# Patient Record
Sex: FEMALE | Race: WHITE | NOT HISPANIC OR LATINO | Employment: OTHER
[De-identification: names, ages, dates, MRNs, and addresses within clinical notes are randomized per-mention and may not be internally consistent; named-entity substitution may affect disease eponyms.]

---

## 2017-12-24 ENCOUNTER — HEALTH MAINTENANCE LETTER (OUTPATIENT)
Age: 57
End: 2017-12-24

## 2020-03-10 ENCOUNTER — HEALTH MAINTENANCE LETTER (OUTPATIENT)
Age: 60
End: 2020-03-10

## 2020-08-24 ENCOUNTER — VIRTUAL VISIT (OUTPATIENT)
Dept: PHARMACY | Facility: CLINIC | Age: 60
End: 2020-08-24
Payer: COMMERCIAL

## 2020-08-24 DIAGNOSIS — J82.83 EOSINOPHILIC ASTHMA: Primary | ICD-10-CM

## 2020-08-24 DIAGNOSIS — J30.2 SEASONAL ALLERGIC RHINITIS, UNSPECIFIED TRIGGER: ICD-10-CM

## 2020-08-24 DIAGNOSIS — G43.909 MIGRAINE WITHOUT STATUS MIGRAINOSUS, NOT INTRACTABLE, UNSPECIFIED MIGRAINE TYPE: ICD-10-CM

## 2020-08-24 DIAGNOSIS — I10 ESSENTIAL HYPERTENSION, BENIGN: ICD-10-CM

## 2020-08-24 DIAGNOSIS — R61 GENERALIZED HYPERHIDROSIS: ICD-10-CM

## 2020-08-24 DIAGNOSIS — K21.00 GASTROESOPHAGEAL REFLUX DISEASE WITH ESOPHAGITIS: ICD-10-CM

## 2020-08-24 DIAGNOSIS — E78.5 HYPERLIPIDEMIA LDL GOAL <100: ICD-10-CM

## 2020-08-24 DIAGNOSIS — M79.7 FIBROMYALGIA: ICD-10-CM

## 2020-08-24 DIAGNOSIS — E03.9 HYPOTHYROIDISM, UNSPECIFIED TYPE: ICD-10-CM

## 2020-08-24 DIAGNOSIS — Z79.82 LONG TERM (CURRENT) USE OF ASPIRIN: ICD-10-CM

## 2020-08-24 PROCEDURE — 99607 MTMS BY PHARM ADDL 15 MIN: CPT | Mod: TEL | Performed by: PHARMACIST

## 2020-08-24 PROCEDURE — 99605 MTMS BY PHARM NP 15 MIN: CPT | Mod: TEL | Performed by: PHARMACIST

## 2020-08-24 RX ORDER — CETIRIZINE HYDROCHLORIDE 10 MG/1
10 TABLET ORAL DAILY
COMMUNITY

## 2020-08-24 RX ORDER — GUAIFENESIN 600 MG/1
600 TABLET, EXTENDED RELEASE ORAL 2 TIMES DAILY
COMMUNITY

## 2020-08-24 RX ORDER — DULOXETIN HYDROCHLORIDE 60 MG/1
120 CAPSULE, DELAYED RELEASE ORAL DAILY
COMMUNITY

## 2020-08-24 RX ORDER — ASPIRIN 81 MG/1
81 TABLET ORAL DAILY
COMMUNITY

## 2020-08-24 RX ORDER — CYCLOBENZAPRINE HCL 10 MG
10 TABLET ORAL 3 TIMES DAILY PRN
COMMUNITY
End: 2024-02-19

## 2020-08-24 RX ORDER — SUCRALFATE 1 G/1
1 TABLET ORAL 4 TIMES DAILY PRN
COMMUNITY

## 2020-08-24 RX ORDER — POTASSIUM CHLORIDE 750 MG/1
1 CAPSULE, EXTENDED RELEASE ORAL DAILY
COMMUNITY
Start: 2020-06-20

## 2020-08-24 NOTE — PROGRESS NOTES
MTM ENCOUNTER  SUBJECTIVE/OBJECTIVE:                           Janelle Allison is a 60 year old female called for an initial visit. She was referred to me from the [facility] RN Case Manager.      Patient consented to a telehealth visit: yes  Telemedicine Visit Details  Type of service:  Telephone visit  Start Time: 11:00 AM  End Time: 11:50 AM  Originating Location (pt. Location): Home  Distant Location (provider location):  [facility]  Mode of Communication:  Telephone    Chief Complaint: Concern for polypharmacy - feels hot all of the time/sweats very bad.    Allergies/ADRs: Reviewed in EHR  Tobacco:  reports that she has never smoked. She has never used smokeless tobacco.  Alcohol: 1-3 beverages / week  Caffeine: 1 diet sodas/day  Activity: moderate at best - back and feet issues  PMH: Reviewed in EHR    Medication Adherence/Access: Patient uses pill box(es).  Patient takes medications 4 time(s) per day.   Per patient, misses medication 0 times per week.   Medication barriers: none.   The patient fills medications at [facility]: NO, fills medications at [facility] in [place], [place].    Asthma/Allergies/GERD: Current asthma medications: Nucala 100 mg every 30 days, prednisone 5 mg daily, montelukast 10 mg daily, fluticasone nasal spray - 2 puffs twice daily, cetirizine 10 mg daily, azithromycin 250 mg three times weekly (MWF), pantoprazole 40 mg twice daily, sucralfate 1 gram four times daily PRN, Advair 230-21 mcg - two puffs twice daily, Flovent  mcg - two puffs twice daily, and albuterol HFA PRN.  Pt rinses their mouth after using steroid inhaler.   Pt reports the following symptoms: none. 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 Follows with Dr. Kevin Chino in pulmonology. Wants to work down on prednisone dose.   AAP on file: NO    Fibromyalgia: Currently taking duloxetine 120 mg daily and pramipexole 0.5 mg in the morning/1 mg at supper/0.5  mg at bedtime. Pt finds this to be effective for fibromyalgia (had pain on left side only back in March 2020 - went on prednisone for pneumonia as well and it's been better since). Pt reports no current side effects.     Hyperlipidemia: Current therapy includes pravastatin 5 mg once daily.  Pt reports no significant myalgias or other side effects. Went to this dose after pain issues earlier this year.     Migraine: Currently taking eletriptan 40 mg daily PRN, cyclobenzaprine 10 mg three times daily PRN (rare), and Excedrin Migraine PRN (rare). Headaches have gotten much better since retiring. Usually 2-3 days in a row when she does get them. Pt finds this to be effective when needed. Pt reports no current issues.     Primary Prevention/Hypertension: Currently taking aspirin 81 mg daily, valsartan-hydrochlorothiazide 320-25 mg daily, potassium 10 mEq daily, and diltiazem 360 mg daily. Patient does not self-monitor BP.  Patient reports no current medication side effects.  BP Readings from Last 3 Encounters:   03/30/20 114/74   06/06/16 (!) 139/96   03/12/15 127/74     Hypothyroidism: Patient is taking levothyroxine 88 mcg daily. Patient is having the following symptoms: none. Last TSH was 0.80 MIU/L (3/26/20).     Hyperhidrosis: Feels very hot/sweating for years - if it's warm just starts to drip sweat - just depends on what she is doing (especially if active).  Nothing makes it better besides fans/A/C.  Does have a wearable fan that has helped.  Not like a hot flash.     Today's Vitals: There were no vitals taken for this visit.     Wt Readings from Last 5 Encounters:   06/06/16 242 lb 1 oz (109.8 kg)   03/12/15 197 lb (89.4 kg)     ASSESSMENT:                              Medication Adherence: good, no issues identified    Asthma/Allergies/GERD: Stable per patient. Now that she is back on Nucala may benefit from working on further taper off prednisone.     Fibromyalgia: Stable per patient.    Hyperlipidemia: Stable.  .    Migraine: Stable.      Primary Prevention/Hypertension: Stable. Last BP at goal of <140/90 mmHg    Hypothyroidism: Stable. Last TSH is WNL.     Hyperhidrosis: Needs Improvement. Medication incidences (cetirizine <1%, levothyroxine, prednisone).        PLAN:                            1. Pt to work with pulmonologist to taper down on dose of prednisone    I spent 50 minutes with this patient today. A copy of the visit note was provided to the patient's primary care provider.    Will follow up in 6 months or sooner if needed.    The patient was sent via [employer] a summary of these recommendations.     Msiael Dorado, AlexD, BCACP  Medication Therapy Management Pharmacist  Pager: [phone]

## 2020-08-26 VITALS — SYSTOLIC BLOOD PRESSURE: 114 MMHG | DIASTOLIC BLOOD PRESSURE: 74 MMHG

## 2020-08-26 RX ORDER — AZITHROMYCIN 250 MG/1
250 TABLET, FILM COATED ORAL
COMMUNITY

## 2020-08-26 RX ORDER — MONTELUKAST SODIUM 10 MG/1
10 TABLET ORAL AT BEDTIME
COMMUNITY

## 2020-12-20 ENCOUNTER — HEALTH MAINTENANCE LETTER (OUTPATIENT)
Age: 60
End: 2020-12-20

## 2021-04-24 ENCOUNTER — HEALTH MAINTENANCE LETTER (OUTPATIENT)
Age: 61
End: 2021-04-24

## 2021-10-03 ENCOUNTER — HEALTH MAINTENANCE LETTER (OUTPATIENT)
Age: 61
End: 2021-10-03

## 2022-03-20 ENCOUNTER — HEALTH MAINTENANCE LETTER (OUTPATIENT)
Age: 62
End: 2022-03-20

## 2022-05-15 ENCOUNTER — HEALTH MAINTENANCE LETTER (OUTPATIENT)
Age: 62
End: 2022-05-15

## 2022-09-11 ENCOUNTER — HEALTH MAINTENANCE LETTER (OUTPATIENT)
Age: 62
End: 2022-09-11

## 2023-02-07 ENCOUNTER — LAB REQUISITION (OUTPATIENT)
Dept: LAB | Facility: CLINIC | Age: 63
End: 2023-02-07

## 2023-02-07 PROCEDURE — G0145 SCR C/V CYTO,THINLAYER,RESCR: HCPCS | Performed by: STUDENT IN AN ORGANIZED HEALTH CARE EDUCATION/TRAINING PROGRAM

## 2023-02-07 PROCEDURE — 87624 HPV HI-RISK TYP POOLED RSLT: CPT | Performed by: STUDENT IN AN ORGANIZED HEALTH CARE EDUCATION/TRAINING PROGRAM

## 2023-02-12 LAB
BKR LAB AP GYN ADEQUACY: NORMAL
BKR LAB AP GYN INTERPRETATION: NORMAL
BKR LAB AP HPV REFLEX: NORMAL
BKR LAB AP PREVIOUS ABNORMAL: NORMAL
PATH REPORT.COMMENTS IMP SPEC: NORMAL
PATH REPORT.COMMENTS IMP SPEC: NORMAL
PATH REPORT.RELEVANT HX SPEC: NORMAL

## 2023-02-14 ENCOUNTER — HOSPITAL ENCOUNTER (OUTPATIENT)
Dept: GENERAL RADIOLOGY | Facility: CLINIC | Age: 63
Discharge: HOME | End: 2023-02-14
Attending: STUDENT IN AN ORGANIZED HEALTH CARE EDUCATION/TRAINING PROGRAM | Admitting: STUDENT IN AN ORGANIZED HEALTH CARE EDUCATION/TRAINING PROGRAM
Payer: COMMERCIAL

## 2023-02-14 DIAGNOSIS — G89.29 CHRONIC LEFT HIP PAIN: ICD-10-CM

## 2023-02-14 DIAGNOSIS — M25.552 CHRONIC LEFT HIP PAIN: ICD-10-CM

## 2023-02-14 PROCEDURE — 73502 X-RAY EXAM HIP UNI 2-3 VIEWS: CPT

## 2023-02-15 LAB
HUMAN PAPILLOMA VIRUS 16 DNA: NEGATIVE
HUMAN PAPILLOMA VIRUS 18 DNA: NEGATIVE
HUMAN PAPILLOMA VIRUS FINAL DIAGNOSIS: NORMAL
HUMAN PAPILLOMA VIRUS OTHER HR: NEGATIVE

## 2023-05-03 ENCOUNTER — TRANSFERRED RECORDS (OUTPATIENT)
Dept: MULTI SPECIALTY CLINIC | Facility: CLINIC | Age: 63
End: 2023-05-03

## 2023-05-03 LAB — RETINOPATHY: NORMAL

## 2023-09-27 ENCOUNTER — TRANSFERRED RECORDS (OUTPATIENT)
Dept: HEALTH INFORMATION MANAGEMENT | Facility: CLINIC | Age: 63
End: 2023-09-27

## 2023-09-27 ENCOUNTER — LAB REQUISITION (OUTPATIENT)
Dept: LAB | Facility: CLINIC | Age: 63
End: 2023-09-27
Payer: COMMERCIAL

## 2023-09-27 DIAGNOSIS — R53.83 OTHER FATIGUE: ICD-10-CM

## 2023-09-27 DIAGNOSIS — D64.9 ANEMIA, UNSPECIFIED: ICD-10-CM

## 2023-09-27 DIAGNOSIS — R61 GENERALIZED HYPERHIDROSIS: ICD-10-CM

## 2023-09-27 LAB
ALT SERPL-CCNC: 9 U/L (ref 0–50)
AST SERPL-CCNC: 15 U/L (ref 0–45)
CREATININE (EXTERNAL): 0.84 MG/DL (ref 0.52–1.04)
GFR ESTIMATED (EXTERNAL): 78 ML/MIN/1.73M2
GLUCOSE (EXTERNAL): 96 MG/DL (ref 70–99)
HBA1C MFR BLD: 5.9 % (ref 0–5.7)
IRON BINDING CAPACITY (ROCHE): ABNORMAL
IRON SATN MFR SERPL: ABNORMAL %
IRON SERPL-MCNC: <5 UG/DL (ref 37–145)
POTASSIUM (EXTERNAL): 3.57 MMOL/L (ref 3.4–5.3)

## 2023-09-27 PROCEDURE — 84403 ASSAY OF TOTAL TESTOSTERONE: CPT | Mod: ORL | Performed by: STUDENT IN AN ORGANIZED HEALTH CARE EDUCATION/TRAINING PROGRAM

## 2023-09-27 PROCEDURE — 83550 IRON BINDING TEST: CPT | Mod: ORL | Performed by: STUDENT IN AN ORGANIZED HEALTH CARE EDUCATION/TRAINING PROGRAM

## 2023-09-27 PROCEDURE — 82670 ASSAY OF TOTAL ESTRADIOL: CPT | Mod: ORL | Performed by: STUDENT IN AN ORGANIZED HEALTH CARE EDUCATION/TRAINING PROGRAM

## 2023-09-28 LAB — ESTRADIOL SERPL-MCNC: <5 PG/ML

## 2023-09-29 LAB — TESTOST SERPL-MCNC: 9 NG/DL (ref 8–60)

## 2023-10-25 ENCOUNTER — TRANSFERRED RECORDS (OUTPATIENT)
Dept: HEALTH INFORMATION MANAGEMENT | Facility: CLINIC | Age: 63
End: 2023-10-25
Payer: COMMERCIAL

## 2023-10-31 ENCOUNTER — ANCILLARY PROCEDURE (OUTPATIENT)
Dept: BONE DENSITY | Facility: CLINIC | Age: 63
End: 2023-10-31
Attending: STUDENT IN AN ORGANIZED HEALTH CARE EDUCATION/TRAINING PROGRAM
Payer: COMMERCIAL

## 2023-10-31 ENCOUNTER — HOSPITAL ENCOUNTER (OUTPATIENT)
Dept: MAMMOGRAPHY | Facility: CLINIC | Age: 63
Discharge: HOME | End: 2023-10-31
Attending: STUDENT IN AN ORGANIZED HEALTH CARE EDUCATION/TRAINING PROGRAM | Admitting: STUDENT IN AN ORGANIZED HEALTH CARE EDUCATION/TRAINING PROGRAM
Payer: COMMERCIAL

## 2023-10-31 DIAGNOSIS — Z78.0 POSTMENOPAUSAL STATE: ICD-10-CM

## 2023-10-31 DIAGNOSIS — Z12.39 BREAST CANCER SCREENING: ICD-10-CM

## 2023-10-31 PROCEDURE — 77080 DXA BONE DENSITY AXIAL: CPT | Performed by: INTERNAL MEDICINE

## 2023-10-31 PROCEDURE — 77067 SCR MAMMO BI INCL CAD: CPT

## 2023-11-09 ENCOUNTER — TRANSCRIBE ORDERS (OUTPATIENT)
Dept: OTHER | Age: 63
End: 2023-11-09

## 2023-11-09 DIAGNOSIS — R61 DIAPHORESIS: ICD-10-CM

## 2023-11-09 DIAGNOSIS — E16.2 LOW BLOOD SUGAR: Primary | ICD-10-CM

## 2023-12-10 ENCOUNTER — HEALTH MAINTENANCE LETTER (OUTPATIENT)
Age: 63
End: 2023-12-10

## 2024-02-08 ENCOUNTER — LAB REQUISITION (OUTPATIENT)
Dept: LAB | Facility: CLINIC | Age: 64
End: 2024-02-08
Payer: COMMERCIAL

## 2024-02-08 DIAGNOSIS — Z12.11 ENCOUNTER FOR SCREENING FOR MALIGNANT NEOPLASM OF COLON: ICD-10-CM

## 2024-02-08 DIAGNOSIS — D64.9 ANEMIA, UNSPECIFIED: ICD-10-CM

## 2024-02-08 DIAGNOSIS — E83.42 HYPOMAGNESEMIA: ICD-10-CM

## 2024-02-08 LAB
BASOPHILS # BLD AUTO: 0.1 10E3/UL (ref 0–0.2)
BASOPHILS NFR BLD AUTO: 1 %
EOSINOPHIL # BLD AUTO: 0.1 10E3/UL (ref 0–0.7)
EOSINOPHIL NFR BLD AUTO: 2 %
ERYTHROCYTE [DISTWIDTH] IN BLOOD BY AUTOMATED COUNT: 17 % (ref 10–15)
HCT VFR BLD AUTO: 30.1 % (ref 35–47)
HGB BLD-MCNC: 8.8 G/DL (ref 11.7–15.7)
IMM GRANULOCYTES # BLD: 0 10E3/UL
IMM GRANULOCYTES NFR BLD: 0 %
IRON BINDING CAPACITY (ROCHE): 386 UG/DL (ref 240–430)
IRON SATN MFR SERPL: 5 % (ref 15–46)
IRON SERPL-MCNC: 18 UG/DL (ref 37–145)
LYMPHOCYTES # BLD AUTO: 1.4 10E3/UL (ref 0.8–5.3)
LYMPHOCYTES NFR BLD AUTO: 19 %
MAGNESIUM SERPL-MCNC: 2.1 MG/DL (ref 1.7–2.3)
MCH RBC QN AUTO: 21.6 PG (ref 26.5–33)
MCHC RBC AUTO-ENTMCNC: 29.2 G/DL (ref 31.5–36.5)
MCV RBC AUTO: 74 FL (ref 78–100)
MONOCYTES # BLD AUTO: 0.7 10E3/UL (ref 0–1.3)
MONOCYTES NFR BLD AUTO: 9 %
NEUTROPHILS # BLD AUTO: 5.1 10E3/UL (ref 1.6–8.3)
NEUTROPHILS NFR BLD AUTO: 69 %
NRBC # BLD AUTO: 0 10E3/UL
NRBC BLD AUTO-RTO: 0 /100
PLATELET # BLD AUTO: 371 10E3/UL (ref 150–450)
RBC # BLD AUTO: 4.07 10E6/UL (ref 3.8–5.2)
RETICS # AUTO: 0.07 10E6/UL (ref 0.03–0.1)
RETICS/RBC NFR AUTO: 1.8 % (ref 0.5–2)
WBC # BLD AUTO: 7.5 10E3/UL (ref 4–11)

## 2024-02-08 PROCEDURE — 85025 COMPLETE CBC W/AUTO DIFF WBC: CPT | Mod: ORL | Performed by: STUDENT IN AN ORGANIZED HEALTH CARE EDUCATION/TRAINING PROGRAM

## 2024-02-08 PROCEDURE — 85045 AUTOMATED RETICULOCYTE COUNT: CPT | Mod: ORL | Performed by: STUDENT IN AN ORGANIZED HEALTH CARE EDUCATION/TRAINING PROGRAM

## 2024-02-08 PROCEDURE — 83735 ASSAY OF MAGNESIUM: CPT | Mod: ORL | Performed by: STUDENT IN AN ORGANIZED HEALTH CARE EDUCATION/TRAINING PROGRAM

## 2024-02-08 PROCEDURE — 99207 BLOOD MORPHOLOGY PATHOLOGIST REVIEW: CPT | Performed by: PATHOLOGY

## 2024-02-08 PROCEDURE — 82728 ASSAY OF FERRITIN: CPT | Mod: ORL | Performed by: STUDENT IN AN ORGANIZED HEALTH CARE EDUCATION/TRAINING PROGRAM

## 2024-02-08 PROCEDURE — 83550 IRON BINDING TEST: CPT | Mod: ORL | Performed by: STUDENT IN AN ORGANIZED HEALTH CARE EDUCATION/TRAINING PROGRAM

## 2024-02-08 PROCEDURE — 82274 ASSAY TEST FOR BLOOD FECAL: CPT | Mod: ORL | Performed by: STUDENT IN AN ORGANIZED HEALTH CARE EDUCATION/TRAINING PROGRAM

## 2024-02-08 PROCEDURE — 82607 VITAMIN B-12: CPT | Mod: ORL | Performed by: STUDENT IN AN ORGANIZED HEALTH CARE EDUCATION/TRAINING PROGRAM

## 2024-02-09 LAB
FERRITIN SERPL-MCNC: 8 NG/ML (ref 11–328)
PATH REPORT.COMMENTS IMP SPEC: NORMAL
PATH REPORT.FINAL DX SPEC: NORMAL
PATH REPORT.MICROSCOPIC SPEC OTHER STN: NORMAL
PATH REPORT.MICROSCOPIC SPEC OTHER STN: NORMAL
VIT B12 SERPL-MCNC: 368 PG/ML (ref 232–1245)

## 2024-02-14 LAB — HEMOCCULT STL QL IA: NEGATIVE

## 2024-02-15 ENCOUNTER — LAB REQUISITION (OUTPATIENT)
Dept: LAB | Facility: CLINIC | Age: 64
End: 2024-02-15
Payer: COMMERCIAL

## 2024-02-15 DIAGNOSIS — R61 GENERALIZED HYPERHIDROSIS: ICD-10-CM

## 2024-02-15 DIAGNOSIS — I10 ESSENTIAL (PRIMARY) HYPERTENSION: ICD-10-CM

## 2024-02-15 DIAGNOSIS — G43.011 MIGRAINE WITHOUT AURA, INTRACTABLE, WITH STATUS MIGRAINOSUS: ICD-10-CM

## 2024-02-15 PROCEDURE — 83835 ASSAY OF METANEPHRINES: CPT | Mod: ORL | Performed by: STUDENT IN AN ORGANIZED HEALTH CARE EDUCATION/TRAINING PROGRAM

## 2024-02-19 ENCOUNTER — OFFICE VISIT (OUTPATIENT)
Dept: ENDOCRINOLOGY | Facility: CLINIC | Age: 64
End: 2024-02-19
Payer: COMMERCIAL

## 2024-02-19 VITALS
TEMPERATURE: 97.8 F | BODY MASS INDEX: 34.19 KG/M2 | OXYGEN SATURATION: 95 % | HEIGHT: 68 IN | DIASTOLIC BLOOD PRESSURE: 67 MMHG | RESPIRATION RATE: 18 BRPM | SYSTOLIC BLOOD PRESSURE: 127 MMHG | WEIGHT: 225.6 LBS | HEART RATE: 93 BPM

## 2024-02-19 DIAGNOSIS — E03.9 HYPOTHYROIDISM, UNSPECIFIED TYPE: Primary | ICD-10-CM

## 2024-02-19 DIAGNOSIS — E11.9 TYPE 2 DIABETES MELLITUS WITHOUT COMPLICATION, WITHOUT LONG-TERM CURRENT USE OF INSULIN (H): ICD-10-CM

## 2024-02-19 DIAGNOSIS — D50.8 OTHER IRON DEFICIENCY ANEMIA: ICD-10-CM

## 2024-02-19 DIAGNOSIS — R61 GENERALIZED HYPERHIDROSIS: ICD-10-CM

## 2024-02-19 PROBLEM — I15.2 HYPERTENSION ASSOCIATED WITH DIABETES (H): Status: ACTIVE | Noted: 2024-02-19

## 2024-02-19 PROBLEM — E11.59 HYPERTENSION ASSOCIATED WITH DIABETES (H): Status: ACTIVE | Noted: 2024-02-19

## 2024-02-19 PROBLEM — F32.A DEPRESSION: Status: ACTIVE | Noted: 2024-02-19

## 2024-02-19 PROBLEM — E61.1 IRON DEFICIENCY: Status: ACTIVE | Noted: 2022-05-04

## 2024-02-19 PROBLEM — D64.9 ANEMIA: Status: ACTIVE | Noted: 2024-02-19

## 2024-02-19 PROBLEM — Z79.52 CURRENT CHRONIC USE OF SYSTEMIC STEROIDS: Status: ACTIVE | Noted: 2021-08-04

## 2024-02-19 PROBLEM — M79.7 FIBROMYALGIA: Status: ACTIVE | Noted: 2024-02-19

## 2024-02-19 PROBLEM — E78.5 HYPERLIPIDEMIA: Status: ACTIVE | Noted: 2024-02-19

## 2024-02-19 PROBLEM — G47.33 OSA ON CPAP: Status: ACTIVE | Noted: 2024-02-19

## 2024-02-19 LAB
ANNOTATION COMMENT IMP: NORMAL
CREAT 24H UR-MRATE: 1495 MG/D
CREAT UR-MCNC: 65 MG/DL
METANEPH 24H UR-MCNC: 28 UG/L
METANEPH 24H UR-MRATE: 64 UG/D
METANEPH+NORMETANEPH UR-IMP: ABNORMAL
METANEPH/CREAT 24H UR: 43 UG/G CRT
METANEPHS SERPL-SCNC: <0.1 NMOL/L
NORMETANEPHRINE 24H UR-MCNC: 173 UG/L
NORMETANEPHRINE 24H UR-MRATE: 398 UG/D
NORMETANEPHRINE SERPL-SCNC: 0.51 NMOL/L
NORMETANEPHRINE/CREAT 24H UR: 266 UG/G CRT

## 2024-02-19 PROCEDURE — 99204 OFFICE O/P NEW MOD 45 MIN: CPT | Performed by: PHYSICIAN ASSISTANT

## 2024-02-19 RX ORDER — METFORMIN HCL 500 MG
TABLET, EXTENDED RELEASE 24 HR ORAL
COMMUNITY
Start: 2023-12-16 | End: 2024-02-19

## 2024-02-19 RX ORDER — MELOXICAM 7.5 MG/1
7.5 TABLET ORAL
COMMUNITY
Start: 2021-09-01 | End: 2024-02-19

## 2024-02-19 RX ORDER — ROPINIROLE 1 MG/1
1 TABLET, FILM COATED ORAL 3 TIMES DAILY
COMMUNITY
Start: 2024-02-08 | End: 2024-04-22

## 2024-02-19 RX ORDER — BUPROPION HYDROCHLORIDE 150 MG/1
TABLET ORAL
COMMUNITY
Start: 2023-12-13

## 2024-02-19 RX ORDER — CALCIUM CITRATE/VITAMIN D3 200MG-6.25
TABLET ORAL DAILY
COMMUNITY
Start: 2023-02-08

## 2024-02-19 RX ORDER — NYSTATIN 100000 [USP'U]/G
POWDER TOPICAL
COMMUNITY
Start: 2023-09-28

## 2024-02-19 RX ORDER — LEVOTHYROXINE SODIUM 75 UG/1
75 TABLET ORAL DAILY
Qty: 90 TABLET | Refills: 0 | Status: SHIPPED | OUTPATIENT
Start: 2024-02-19 | End: 2024-04-22

## 2024-02-19 RX ORDER — HYDROCORTISONE 2.5 %
CREAM (GRAM) TOPICAL
COMMUNITY
Start: 2022-10-10 | End: 2024-02-19

## 2024-02-19 RX ORDER — PRAVASTATIN SODIUM 10 MG
1 TABLET ORAL DAILY
COMMUNITY
Start: 2022-09-25

## 2024-02-19 RX ORDER — AMOXICILLIN 250 MG
1 CAPSULE ORAL 2 TIMES DAILY PRN
COMMUNITY
Start: 2022-04-19

## 2024-02-19 RX ORDER — KETOCONAZOLE 20 MG/G
CREAM TOPICAL
COMMUNITY
Start: 2022-10-10 | End: 2024-02-19

## 2024-02-19 RX ORDER — IPRATROPIUM BROMIDE 21 UG/1
2 SPRAY, METERED NASAL EVERY 12 HOURS
COMMUNITY

## 2024-02-19 RX ORDER — BACLOFEN 10 MG/1
TABLET ORAL
COMMUNITY
Start: 2021-06-01

## 2024-02-19 RX ORDER — BECLOMETHASONE DIPROPIONATE HFA 80 UG/1
1 AEROSOL, METERED RESPIRATORY (INHALATION) 2 TIMES DAILY
COMMUNITY

## 2024-02-19 RX ORDER — CELECOXIB 100 MG/1
100 CAPSULE ORAL PRN
COMMUNITY
End: 2024-04-22

## 2024-02-19 NOTE — LETTER
2/19/2024         RE: Janelle Allison  [address] [facility] [address]        Dear Colleague,    Thank you for referring your patient, Janelle Allison, to the [facility]. Please see a copy of my visit note below.    Assessment/Plan :   Hyperhidrosis. We discussed possible causes of excess sweating and feeling lightheaded. We also reviewed her recent laboratory results. I don't think the sweats or lightheadedness is due to her diabetes or hypoglycemia. She appears to be stable on metformin. We also discussed that her recent metanephrine testing was normal. Her TSH is on the low side. This doesn't sound like it is the root cause of the sweating but it may be contributing. I would like to decrease her levothyroxine dose down to 75 mcg daily. A new prescription was sent to her pharmacy today. We will repeat testing in about a month and we will follow-up in a couple months. I would like to see what impact the lower thyroid dose has on her sweating.  Anemia. I am worried about Janelle's anemia. She is no longer menstruating and she has no reason to be iron deficient. I encouraged her to go forward with the infusions and the endoscopy/colonoscopy. We also discussed how anemia can impact energy and restless legs. Again, we will follow-up in a couple months after her iron infusions have been completed and she has had both the endoscopy and colonoscopy completed.       I have independently reviewed and interpreted labs, imaging as indicated.      Chief complaint:  Janelle is a 63 year old female seen in consultation at the request of Dr. Shearer for diaphoresis and lightheadedness.     I have reviewed Care Everywhere including [facility], [facility], [facility], [place], [facility], [facility] , [facility], [facility] lab reports, imaging reports and provider notes as indicated.      HISTORY OF PRESENT ILLNESS  Janelle states that for years she has struggled with sweating. She  always feels hot. It doesn't matter if she is sitting in a room with air conditioning, she will break out in a sweat with even the slightest exertion. These episodes do not seem to be related to anything in particular but they do occur more often in the afternoon or evening. Most recently, she has also felt off. She describes this as being lightheadedness or airheadedness. She just doesn't feel right.    Janelle has a complicated medical history that includes HTN, obesity, Type 2 DM, hyperlipidemia, ABIMAEL, asthma with long term use of prednisone, and hypothyroidism. Her diabetes has been well control on 1000 mg of metformin daily. She monitors her blood sugars as needed and recently wore a 14 day FreeStyle Daryl. She reports that she had one blood sugar reading that was on the low side but, otherwise, everything was within target range. She also reports that she has taken prednisone for years. She has tried to discontinue or decrease the dose but she will immediately experience a flare up of her asthma. Lastly, she has been stable on 88 mcg of levothyroxine for the last few years. Recent testing indicated that her TSH was overly suppressed. She was told that decreasing the dose may lead to worsening fatigue. Lastly, she was also recently diagnosed with iron deficient anemia. She recently underwent her first iron infusion.     Endocrine relevant labs are as follows:   Latest Reference Range & Units 02/08/24 08:36   Hemoglobin 11.7 - 15.7 g/dL 8.8 (L)   (L): Data is abnormally low   Latest Reference Range & Units 02/08/24 08:36   Hematocrit 35.0 - 47.0 % 30.1 (L)   (L): Data is abnormally low   Latest Reference Range & Units 02/08/24 08:35   Ferritin 11 - 328 ng/mL 8 (L)   (L): Data is abnormally low   Latest Reference Range & Units 02/08/24 08:35   Iron 37 - 145 ug/dL 18 (L)   (L): Data is abnormally low   Latest Reference Range & Units 02/08/24 08:35   Iron Sat Index 15 - 46 % 5 (L)   (L): Data is abnormally  low    REVIEW OF SYSTEMS    Endocrine: positive for thyroid disorder, hot flashes, and diabetes  Skin: positive for hyperhidrosis  Eyes: negative for, visual blurring, redness, tearing  Ears/Nose/Throat: negative for, persistent sore throat, hoarseness  Respiratory: No shortness of breath, dyspnea on exertion, cough, or hemoptysis  Cardiovascular: negative for, irregular heart beat, chest pain, dyspnea on exertion, lower extremity edema, and exercise intolerance  Gastrointestinal: negative for, nausea, vomiting, constipation, and diarrhea  Genitourinary: negative for, nocturia, dysuria, frequency, and urgency  Musculoskeletal: negative for, muscular weakness, nocturnal cramping, and foot pain  Neurologic: positive for restless legs, negative for, local weakness, numbness or tingling of hands, and numbness or tingling of feet  Psychiatric: positive for sleep disturbance, anxiety, and depression stable  Hematologic/Lymphatic/Immunologic: positive for anemia and sweats    Past Medical History  Past Medical History:   Diagnosis Date     Arthritis      CPAP (continuous positive airway pressure) dependence      Double vision      Fibromyalgia      Gastro-oesophageal reflux disease      Hypertension      Migraines      RLS (restless legs syndrome)      Sleep apnea     cpap     Thyroid disease      Uncomplicated asthma        Medications  Current Outpatient Medications   Medication Sig Dispense Refill     albuterol (VENTOLIN HFA) 108 (90 BASE) MCG/ACT inhaler Inhale 2 puffs into the lungs every 6 hours as needed        aspirin 81 MG EC tablet Take 81 mg by mouth daily       Aspirin-Acetaminophen-Caffeine (MIGRAINE RELIEF PO) Take 1 tablet by mouth 2 times daily as needed       azithromycin (ZITHROMAX) 250 MG tablet Take 250 mg by mouth Every Mon, Wed, Fri Morning       baclofen (LIORESAL) 10 MG tablet TK 1 T PO TID WC AND WF PRF MUSCLE SPASM       beclomethasone HFA (QVAR REDIHALER) 80 MCG/ACT inhaler Inhale 1 puff into the  lungs 2 times daily       blood glucose (TRUE METRIX BLOOD GLUCOSE TEST) test strip daily       buPROPion (WELLBUTRIN XL) 150 MG 24 hr tablet        Calcium Carbonate-Vitamin D (CALCIUM + D PO) Take 1 tablet by mouth three times a week        celecoxib (CELEBREX) 100 MG capsule Take 100 mg by mouth as needed for moderate pain       cetirizine (ZYRTEC) 10 MG tablet Take 10 mg by mouth daily       Cholecalciferol (VITAMIN D3 PO) Take 25 mcg by mouth once a week        DILTIAZEM HCL PO Take 360 mg by mouth daily       DULoxetine (CYMBALTA) 60 MG capsule Take 120 mg by mouth daily       Eletriptan Hydrobromide (RELPAX PO) Take 40 mg by mouth once as needed        fluticasone (FLONASE) 50 MCG/ACT nasal spray Spray 2 sprays into both nostrils 2 times daily       fluticasone (FLOVENT HFA) 220 MCG/ACT inhaler Inhale 2 puffs into the lungs 2 times daily       fluticasone-salmeterol (ADVAIR-HFA) 230-21 MCG/ACT inhaler Inhale 2 puffs into the lungs 2 times daily       guaiFENesin (MUCINEX) 600 MG 12 hr tablet Take 600 mg by mouth 2 times daily       Hypertonic Nasal Wash (SINUS RINSE KIT) PACK [place] in nostril 2 times daily        ipratropium (ATROVENT) 0.03 % nasal spray Spray 2 sprays into both nostrils every 12 hours       LEVOTHYROXINE SODIUM PO Take 88 mcg by mouth daily        Mepolizumab (NUCALA SC) Inject 100 mg Subcutaneous every 30 days       montelukast (SINGULAIR) 10 MG tablet Take 10 mg by mouth At Bedtime       Multiple Vitamins-Minerals (CENTRUM ULTRA WOMENS PO)        nystatin (MYCOSTATIN) 562268 UNIT/GM external powder        Pantoprazole Sodium (PROTONIX PO) Take 40 mg by mouth 2 times daily (before meals)       potassium chloride ER (MICRO-K) 10 MEQ CR capsule Take 1 capsule by mouth daily       pravastatin (PRAVACHOL) 10 MG tablet Take 1 tablet by mouth daily       PREDNISONE PO Take 5 mg by mouth daily        rOPINIRole (REQUIP) 1 MG tablet        senna-docusate (SENOKOT-S/PERICOLACE) 8.6-50 MG tablet Take  "1 tablet by mouth 2 times daily as needed       sucralfate (CARAFATE) 1 GM tablet Take 1 g by mouth 4 times daily as needed       umeclidinium (INCRUSE ELLIPTA) 62.5 MCG/ACT inhaler Inhale 1 puff into the lungs daily       valsartan-hydrochlorothiazide (DIOVAN-HCT) 320-25 MG per tablet Take 1 tablet by mouth daily         Allergies  Allergies   Allergen Reactions     Hydralazine Headache and Other (See Comments)     [facility] Comment: Plus fever. ; [facility] Reaction: Other    Plus fever.     Plus fever.     Azelastine      Made her feel like she had a cold     Fosamax [Alendronate] Other (See Comments)     Joint pain     Levaquin [Levofloxacin]      Molds & Smuts      Nizatidine      Ranitidine Diarrhea     Other Reaction(s): Not available     Atorvastatin Muscle Pain (Myalgia), Unknown and Other (See Comments)     Aching knees and sore joints.    Achy joints; [facility] Comment: Aching knees and sore joints.; [facility] Reaction: Other; [facility] Reaction Type: Unspecified; [facility] Severity: Low; [facility] Noted: [id number]         Family History  family history is not on file.    Social History  Social History     Tobacco Use     Smoking status: Never     Smokeless tobacco: Never   Substance Use Topics     Alcohol use: Yes     Comment: occasional     Drug use: No       Physical Exam  /67 (BP Location: Left arm, Patient Position: Chair, Cuff Size: Adult Regular)   Pulse 93   Temp 97.8  F (36.6  C) (Oral)   Resp 18   Ht 1.727 m (5' 7.99\")   Wt 102.3 kg (225 lb 9.6 oz)   LMP  (LMP Unknown)   SpO2 95%   Breastfeeding No   BMI 34.31 kg/m    Body mass index is 34.31 kg/m .  GENERAL :  In no apparent distress  SKIN: Normal color, normal temperature, texture.  No hirsutism, alopecia or purple striae.     EYES: PERRL, EOMI, No scleral icterus,  No proptosis, conjunctival redness, stare, retraction  NECK: No visible masses. No palpable adenopathy, or masses. No carotid bruits. THYROID:  Normal, nontender, smooth / firm texture,  no nodules, no Bruit "   RESP: Lungs clear to auscultation bilaterally  CARDIAC: Regular rate and rhythm, normal S1 S2, without murmurs, rubs or gallops  NEURO: awake, alert, responds appropriately to questions.  Cranial nerves intact.   Moves all extremities; Gait normal.  No tremor of the outstretched hand.    EXTREMITIES: No clubbing, cyanosis or edema.    DATA REVIEW  She does not monitor her blood sugars consistently        Again, thank you for allowing me to participate in the care of your patient.        Sincerely,        Neli Blanchard PA-C

## 2024-02-19 NOTE — PROGRESS NOTES
Assessment/Plan :   Hyperhidrosis. We discussed possible causes of excess sweating and feeling lightheaded. We also reviewed her recent laboratory results. I don't think the sweats or lightheadedness is due to her diabetes or hypoglycemia. She appears to be stable on metformin. We also discussed that her recent metanephrine testing was normal. Her TSH is on the low side. This doesn't sound like it is the root cause of the sweating but it may be contributing. I would like to decrease her levothyroxine dose down to 75 mcg daily. A new prescription was sent to her pharmacy today. We will repeat testing in about a month and we will follow-up in a couple months. I would like to see what impact the lower thyroid dose has on her sweating.  Anemia. I am worried about Janelle's anemia. She is no longer menstruating and she has no reason to be iron deficient. I encouraged her to go forward with the infusions and the endoscopy/colonoscopy. We also discussed how anemia can impact energy and restless legs. Again, we will follow-up in a couple months after her iron infusions have been completed and she has had both the endoscopy and colonoscopy completed.       I have independently reviewed and interpreted labs, imaging as indicated.      Chief complaint:  Janelle is a 63 year old female seen in consultation at the request of Dr. Shearer for diaphoresis and lightheadedness.     I have reviewed Care Everywhere including [facility], [facility], [facility], [facility] , [facility], [place] lab reports, imaging reports and provider notes as indicated.      HISTORY OF PRESENT ILLNESS  Janelle states that for years she has struggled with sweating. She always feels hot. It doesn't matter if she is sitting in a room with air conditioning, she will break out in a sweat with even the slightest exertion. These episodes do not seem to be related to anything in particular but they do occur more often in the  afternoon or evening. Most recently, she has also felt off. She describes this as being lightheadedness or airheadedness. She just doesn't feel right.    Janelle has a complicated medical history that includes HTN, obesity, Type 2 DM, hyperlipidemia, ABIMAEL, asthma with long term use of prednisone, and hypothyroidism. Her diabetes has been well control on 1000 mg of metformin daily. She monitors her blood sugars as needed and recently wore a 14 day FreeStyle Daryl. She reports that she had one blood sugar reading that was on the low side but, otherwise, everything was within target range. She also reports that she has taken prednisone for years. She has tried to discontinue or decrease the dose but she will immediately experience a flare up of her asthma. Lastly, she has been stable on 88 mcg of levothyroxine for the last few years. Recent testing indicated that her TSH was overly suppressed. She was told that decreasing the dose may lead to worsening fatigue. Lastly, she was also recently diagnosed with iron deficient anemia. She recently underwent her first iron infusion.     Endocrine relevant labs are as follows:   Latest Reference Range & Units 02/08/24 08:36   Hemoglobin 11.7 - 15.7 g/dL 8.8 (L)   (L): Data is abnormally low   Latest Reference Range & Units 02/08/24 08:36   Hematocrit 35.0 - 47.0 % 30.1 (L)   (L): Data is abnormally low   Latest Reference Range & Units 02/08/24 08:35   Ferritin 11 - 328 ng/mL 8 (L)   (L): Data is abnormally low   Latest Reference Range & Units 02/08/24 08:35   Iron 37 - 145 ug/dL 18 (L)   (L): Data is abnormally low   Latest Reference Range & Units 02/08/24 08:35   Iron Sat Index 15 - 46 % 5 (L)   (L): Data is abnormally low    REVIEW OF SYSTEMS    Endocrine: positive for thyroid disorder, hot flashes, and diabetes  Skin: positive for hyperhidrosis  Eyes: negative for, visual blurring, redness, tearing  Ears/Nose/Throat: negative for, persistent sore throat,  hoarseness  Respiratory: No shortness of breath, dyspnea on exertion, cough, or hemoptysis  Cardiovascular: negative for, irregular heart beat, chest pain, dyspnea on exertion, lower extremity edema, and exercise intolerance  Gastrointestinal: negative for, nausea, vomiting, constipation, and diarrhea  Genitourinary: negative for, nocturia, dysuria, frequency, and urgency  Musculoskeletal: negative for, muscular weakness, nocturnal cramping, and foot pain  Neurologic: positive for restless legs, negative for, local weakness, numbness or tingling of hands, and numbness or tingling of feet  Psychiatric: positive for sleep disturbance, anxiety, and depression stable  Hematologic/Lymphatic/Immunologic: positive for anemia and sweats    Past Medical History  Past Medical History:   Diagnosis Date    Arthritis     CPAP (continuous positive airway pressure) dependence     Double vision     Fibromyalgia     Gastro-oesophageal reflux disease     Hypertension     Migraines     RLS (restless legs syndrome)     Sleep apnea     cpap    Thyroid disease     Uncomplicated asthma        Medications  Current Outpatient Medications   Medication Sig Dispense Refill    albuterol (VENTOLIN HFA) 108 (90 BASE) MCG/ACT inhaler Inhale 2 puffs into the lungs every 6 hours as needed       aspirin 81 MG EC tablet Take 81 mg by mouth daily      Aspirin-Acetaminophen-Caffeine (MIGRAINE RELIEF PO) Take 1 tablet by mouth 2 times daily as needed      azithromycin (ZITHROMAX) 250 MG tablet Take 250 mg by mouth Every Mon, Wed, Fri Morning      baclofen (LIORESAL) 10 MG tablet TK 1 T PO TID WC AND WF PRF MUSCLE SPASM      beclomethasone HFA (QVAR REDIHALER) 80 MCG/ACT inhaler Inhale 1 puff into the lungs 2 times daily      blood glucose (TRUE METRIX BLOOD GLUCOSE TEST) test strip daily      buPROPion (WELLBUTRIN XL) 150 MG 24 hr tablet       Calcium Carbonate-Vitamin D (CALCIUM + D PO) Take 1 tablet by mouth three times a week       celecoxib  (CELEBREX) 100 MG capsule Take 100 mg by mouth as needed for moderate pain      cetirizine (ZYRTEC) 10 MG tablet Take 10 mg by mouth daily      Cholecalciferol (VITAMIN D3 PO) Take 25 mcg by mouth once a week       DILTIAZEM HCL PO Take 360 mg by mouth daily      DULoxetine (CYMBALTA) 60 MG capsule Take 120 mg by mouth daily      Eletriptan Hydrobromide (RELPAX PO) Take 40 mg by mouth once as needed       fluticasone (FLONASE) 50 MCG/ACT nasal spray Spray 2 sprays into both nostrils 2 times daily      fluticasone (FLOVENT HFA) 220 MCG/ACT inhaler Inhale 2 puffs into the lungs 2 times daily      fluticasone-salmeterol (ADVAIR-HFA) 230-21 MCG/ACT inhaler Inhale 2 puffs into the lungs 2 times daily      guaiFENesin (MUCINEX) 600 MG 12 hr tablet Take 600 mg by mouth 2 times daily      Hypertonic Nasal Wash (SINUS RINSE KIT) PACK [place] in nostril 2 times daily       ipratropium (ATROVENT) 0.03 % nasal spray Spray 2 sprays into both nostrils every 12 hours      LEVOTHYROXINE SODIUM PO Take 88 mcg by mouth daily       Mepolizumab (NUCALA SC) Inject 100 mg Subcutaneous every 30 days      montelukast (SINGULAIR) 10 MG tablet Take 10 mg by mouth At Bedtime      Multiple Vitamins-Minerals (CENTRUM ULTRA WOMENS PO)       nystatin (MYCOSTATIN) 073309 UNIT/GM external powder       Pantoprazole Sodium (PROTONIX PO) Take 40 mg by mouth 2 times daily (before meals)      potassium chloride ER (MICRO-K) 10 MEQ CR capsule Take 1 capsule by mouth daily      pravastatin (PRAVACHOL) 10 MG tablet Take 1 tablet by mouth daily      PREDNISONE PO Take 5 mg by mouth daily       rOPINIRole (REQUIP) 1 MG tablet       senna-docusate (SENOKOT-S/PERICOLACE) 8.6-50 MG tablet Take 1 tablet by mouth 2 times daily as needed      sucralfate (CARAFATE) 1 GM tablet Take 1 g by mouth 4 times daily as needed      umeclidinium (INCRUSE ELLIPTA) 62.5 MCG/ACT inhaler Inhale 1 puff into the lungs daily      valsartan-hydrochlorothiazide (DIOVAN-HCT) 320-25 MG  "per tablet Take 1 tablet by mouth daily         Allergies  Allergies   Allergen Reactions    Hydralazine Headache and Other (See Comments)     OHC Comment: Plus fever. ; [facility] Reaction: Other    Plus fever.     Plus fever.    Azelastine      Made her feel like she had a cold    Fosamax [Alendronate] Other (See Comments)     Joint pain    Levaquin [Levofloxacin]     Molds & Smuts     Nizatidine     Ranitidine Diarrhea     Other Reaction(s): Not available    Atorvastatin Muscle Pain (Myalgia), Unknown and Other (See Comments)     Aching knees and sore joints.    Achy joints; OHC Comment: Aching knees and sore joints.; [facility] Reaction: Other; [facility] Reaction Type: Unspecified; [facility] Severity: Low; [facility] Noted: 20161101         Family History  family history is not on file.    Social History  Social History     Tobacco Use    Smoking status: Never    Smokeless tobacco: Never   Substance Use Topics    Alcohol use: Yes     Comment: occasional    Drug use: No       Physical Exam  /67 (BP Location: Left arm, Patient Position: Chair, Cuff Size: Adult Regular)   Pulse 93   Temp 97.8  F (36.6  C) (Oral)   Resp 18   Ht 1.727 m (5' 7.99\")   Wt 102.3 kg (225 lb 9.6 oz)   LMP  (LMP Unknown)   SpO2 95%   Breastfeeding No   BMI 34.31 kg/m    Body mass index is 34.31 kg/m .  GENERAL :  In no apparent distress  SKIN: Normal color, normal temperature, texture.  No hirsutism, alopecia or purple striae.     EYES: PERRL, EOMI, No scleral icterus,  No proptosis, conjunctival redness, stare, retraction  NECK: No visible masses. No palpable adenopathy, or masses. No carotid bruits. THYROID:  Normal, nontender, smooth / firm texture,  no nodules, no Bruit   RESP: Lungs clear to auscultation bilaterally  CARDIAC: Regular rate and rhythm, normal S1 S2, without murmurs, rubs or gallops  NEURO: awake, alert, responds appropriately to questions.  Cranial nerves intact.   Moves all extremities; Gait normal.  No tremor of the outstretched hand.  "   EXTREMITIES: No clubbing, cyanosis or edema.    DATA REVIEW  She does not monitor her blood sugars consistently

## 2024-02-19 NOTE — PATIENT INSTRUCTIONS
[facility]  Dr Diaz, Endocrinology Department    [address] [facility]  [place] [address]  Appointment [phone]  Fax: [phone]  [place]: Monday - Thursday

## 2024-02-19 NOTE — Clinical Note
Please abstract the following data from this visit with this patient into the appropriate field in Epic:  Tests that can be patient reported without a hard copy:  Eye exam with ophthalmology on this date: 05/03/23 Exam Location: [place]  Other Tests found in the patient's chart through Chart Review/Care Everywhere:    Note to [employer]: If this section is blank, no results were found via Chart Review/Care Everywhere.

## 2024-03-06 ENCOUNTER — HOSPITAL ENCOUNTER (OUTPATIENT)
Facility: CLINIC | Age: 64
End: 2024-03-06
Attending: INTERNAL MEDICINE | Admitting: INTERNAL MEDICINE
Payer: COMMERCIAL

## 2024-03-15 ENCOUNTER — TRANSFERRED RECORDS (OUTPATIENT)
Dept: HEALTH INFORMATION MANAGEMENT | Facility: CLINIC | Age: 64
End: 2024-03-15
Payer: COMMERCIAL

## 2024-03-15 ENCOUNTER — LAB REQUISITION (OUTPATIENT)
Dept: LAB | Facility: CLINIC | Age: 64
End: 2024-03-15

## 2024-03-15 DIAGNOSIS — D50.8 OTHER IRON DEFICIENCY ANEMIAS: ICD-10-CM

## 2024-03-15 LAB
IRON BINDING CAPACITY (ROCHE): 331 UG/DL (ref 240–430)
IRON SATN MFR SERPL: 15 % (ref 15–46)
IRON SERPL-MCNC: 51 UG/DL (ref 37–145)
T4 FREE SERPL-MCNC: 1.38 NG/DL (ref 0.9–1.7)
TSH SERPL DL<=0.005 MIU/L-ACNC: 1.61 UIU/ML (ref 0.3–4.2)

## 2024-03-15 PROCEDURE — 84480 ASSAY TRIIODOTHYRONINE (T3): CPT | Performed by: STUDENT IN AN ORGANIZED HEALTH CARE EDUCATION/TRAINING PROGRAM

## 2024-03-15 PROCEDURE — 84443 ASSAY THYROID STIM HORMONE: CPT | Performed by: STUDENT IN AN ORGANIZED HEALTH CARE EDUCATION/TRAINING PROGRAM

## 2024-03-15 PROCEDURE — 83550 IRON BINDING TEST: CPT | Performed by: STUDENT IN AN ORGANIZED HEALTH CARE EDUCATION/TRAINING PROGRAM

## 2024-03-15 PROCEDURE — 84439 ASSAY OF FREE THYROXINE: CPT | Performed by: STUDENT IN AN ORGANIZED HEALTH CARE EDUCATION/TRAINING PROGRAM

## 2024-03-16 LAB — T3 SERPL-MCNC: 84 NG/DL (ref 85–202)

## 2024-03-18 ENCOUNTER — TELEPHONE (OUTPATIENT)
Dept: ENDOCRINOLOGY | Facility: CLINIC | Age: 64
End: 2024-03-18

## 2024-03-20 ENCOUNTER — ANESTHESIA EVENT (OUTPATIENT)
Dept: SURGERY | Facility: CLINIC | Age: 64
End: 2024-03-20
Payer: COMMERCIAL

## 2024-03-20 ENCOUNTER — HOSPITAL ENCOUNTER (OUTPATIENT)
Facility: CLINIC | Age: 64
Discharge: HOME | End: 2024-03-20
Attending: INTERNAL MEDICINE | Admitting: INTERNAL MEDICINE
Payer: COMMERCIAL

## 2024-03-20 ENCOUNTER — ANESTHESIA (OUTPATIENT)
Dept: SURGERY | Facility: CLINIC | Age: 64
End: 2024-03-20
Payer: COMMERCIAL

## 2024-03-20 VITALS
DIASTOLIC BLOOD PRESSURE: 77 MMHG | HEART RATE: 75 BPM | BODY MASS INDEX: 34.22 KG/M2 | SYSTOLIC BLOOD PRESSURE: 150 MMHG | OXYGEN SATURATION: 95 % | TEMPERATURE: 97.1 F | WEIGHT: 225 LBS | RESPIRATION RATE: 18 BRPM

## 2024-03-20 LAB
GLUCOSE BLDC GLUCOMTR-MCNC: 87 MG/DL (ref 70–99)
UPPER GI ENDOSCOPY: NORMAL

## 2024-03-20 PROCEDURE — 82962 GLUCOSE BLOOD TEST: CPT

## 2024-03-20 PROCEDURE — 360N000075 HC SURGERY LEVEL 2, PER MIN: Performed by: INTERNAL MEDICINE

## 2024-03-20 PROCEDURE — 250N000009 HC RX 250: Performed by: ANESTHESIOLOGY

## 2024-03-20 PROCEDURE — 258N000003 HC RX IP 258 OP 636: Performed by: ANESTHESIOLOGY

## 2024-03-20 PROCEDURE — 88305 TISSUE EXAM BY PATHOLOGIST: CPT | Mod: TC | Performed by: INTERNAL MEDICINE

## 2024-03-20 PROCEDURE — 370N000017 HC ANESTHESIA TECHNICAL FEE, PER MIN: Performed by: INTERNAL MEDICINE

## 2024-03-20 PROCEDURE — 88342 IMHCHEM/IMCYTCHM 1ST ANTB: CPT | Mod: 26 | Performed by: PATHOLOGY

## 2024-03-20 PROCEDURE — 272N000001 HC OR GENERAL SUPPLY STERILE: Performed by: INTERNAL MEDICINE

## 2024-03-20 PROCEDURE — 250N000011 HC RX IP 250 OP 636: Performed by: ANESTHESIOLOGY

## 2024-03-20 PROCEDURE — 88305 TISSUE EXAM BY PATHOLOGIST: CPT | Mod: 26 | Performed by: PATHOLOGY

## 2024-03-20 PROCEDURE — 999N000141 HC STATISTIC PRE-PROCEDURE NURSING ASSESSMENT: Performed by: INTERNAL MEDICINE

## 2024-03-20 PROCEDURE — 710N000012 HC RECOVERY PHASE 2, PER MINUTE: Performed by: INTERNAL MEDICINE

## 2024-03-20 RX ORDER — PROPOFOL 10 MG/ML
INJECTION, EMULSION INTRAVENOUS CONTINUOUS PRN
Status: DISCONTINUED | OUTPATIENT
Start: 2024-03-20 | End: 2024-03-20

## 2024-03-20 RX ORDER — PROPOFOL 10 MG/ML
INJECTION, EMULSION INTRAVENOUS PRN
Status: DISCONTINUED | OUTPATIENT
Start: 2024-03-20 | End: 2024-03-20

## 2024-03-20 RX ORDER — NALOXONE HYDROCHLORIDE 0.4 MG/ML
0.2 INJECTION, SOLUTION INTRAMUSCULAR; INTRAVENOUS; SUBCUTANEOUS
Status: DISCONTINUED | OUTPATIENT
Start: 2024-03-20 | End: 2024-03-20 | Stop reason: HOSPADM

## 2024-03-20 RX ORDER — PROCHLORPERAZINE MALEATE 10 MG
10 TABLET ORAL EVERY 6 HOURS PRN
Status: DISCONTINUED | OUTPATIENT
Start: 2024-03-20 | End: 2024-03-20 | Stop reason: HOSPADM

## 2024-03-20 RX ORDER — NALOXONE HYDROCHLORIDE 0.4 MG/ML
0.4 INJECTION, SOLUTION INTRAMUSCULAR; INTRAVENOUS; SUBCUTANEOUS
Status: DISCONTINUED | OUTPATIENT
Start: 2024-03-20 | End: 2024-03-20 | Stop reason: HOSPADM

## 2024-03-20 RX ORDER — ONDANSETRON 2 MG/ML
4 INJECTION INTRAMUSCULAR; INTRAVENOUS EVERY 30 MIN PRN
Status: DISCONTINUED | OUTPATIENT
Start: 2024-03-20 | End: 2024-03-20 | Stop reason: HOSPADM

## 2024-03-20 RX ORDER — ONDANSETRON 2 MG/ML
4 INJECTION INTRAMUSCULAR; INTRAVENOUS EVERY 6 HOURS PRN
Status: DISCONTINUED | OUTPATIENT
Start: 2024-03-20 | End: 2024-03-20 | Stop reason: HOSPADM

## 2024-03-20 RX ORDER — LIDOCAINE HYDROCHLORIDE 10 MG/ML
INJECTION, SOLUTION INFILTRATION; PERINEURAL PRN
Status: DISCONTINUED | OUTPATIENT
Start: 2024-03-20 | End: 2024-03-20

## 2024-03-20 RX ORDER — SODIUM CHLORIDE, SODIUM LACTATE, POTASSIUM CHLORIDE, CALCIUM CHLORIDE 600; 310; 30; 20 MG/100ML; MG/100ML; MG/100ML; MG/100ML
INJECTION, SOLUTION INTRAVENOUS CONTINUOUS
Status: DISCONTINUED | OUTPATIENT
Start: 2024-03-20 | End: 2024-03-20 | Stop reason: HOSPADM

## 2024-03-20 RX ORDER — ONDANSETRON 4 MG/1
4 TABLET, ORALLY DISINTEGRATING ORAL EVERY 6 HOURS PRN
Status: DISCONTINUED | OUTPATIENT
Start: 2024-03-20 | End: 2024-03-20 | Stop reason: HOSPADM

## 2024-03-20 RX ORDER — FLUMAZENIL 0.1 MG/ML
0.2 INJECTION, SOLUTION INTRAVENOUS
Status: DISCONTINUED | OUTPATIENT
Start: 2024-03-20 | End: 2024-03-20 | Stop reason: HOSPADM

## 2024-03-20 RX ORDER — LIDOCAINE 40 MG/G
CREAM TOPICAL
Status: DISCONTINUED | OUTPATIENT
Start: 2024-03-20 | End: 2024-03-20 | Stop reason: HOSPADM

## 2024-03-20 RX ORDER — ONDANSETRON 4 MG/1
4 TABLET, ORALLY DISINTEGRATING ORAL EVERY 30 MIN PRN
Status: DISCONTINUED | OUTPATIENT
Start: 2024-03-20 | End: 2024-03-20 | Stop reason: HOSPADM

## 2024-03-20 RX ORDER — NALOXONE HYDROCHLORIDE 0.4 MG/ML
0.1 INJECTION, SOLUTION INTRAMUSCULAR; INTRAVENOUS; SUBCUTANEOUS
Status: DISCONTINUED | OUTPATIENT
Start: 2024-03-20 | End: 2024-03-20 | Stop reason: HOSPADM

## 2024-03-20 RX ADMIN — PROPOFOL 100 MG: 10 INJECTION, EMULSION INTRAVENOUS at 09:12

## 2024-03-20 RX ADMIN — LIDOCAINE HYDROCHLORIDE 20 MG: 10 INJECTION, SOLUTION INFILTRATION; PERINEURAL at 09:11

## 2024-03-20 RX ADMIN — SODIUM CHLORIDE, POTASSIUM CHLORIDE, SODIUM LACTATE AND CALCIUM CHLORIDE: 600; 310; 30; 20 INJECTION, SOLUTION INTRAVENOUS at 08:32

## 2024-03-20 RX ADMIN — PROPOFOL 150 MCG/KG/MIN: 10 INJECTION, EMULSION INTRAVENOUS at 09:12

## 2024-03-20 ASSESSMENT — ACTIVITIES OF DAILY LIVING (ADL)
ADLS_ACUITY_SCORE: 35
ADLS_ACUITY_SCORE: 35

## 2024-03-20 NOTE — INTERVAL H&P NOTE
"I have reviewed the surgical (or preoperative) H&P that is linked to this encounter, and examined the patient. There are no significant changes    Clinical Conditions Present on Arrival:  Clinically Significant Risk Factors Present on Admission                 # Drug Induced Platelet Defect: home medication list includes an antiplatelet medication  # Obesity: Estimated body mass index is 34.22 kg/m  as calculated from the following:    Height as of 2/19/24: 1.727 m (5' 7.99\").    Weight as of this encounter: 102.1 kg (225 lb).       "

## 2024-03-20 NOTE — ANESTHESIA CARE TRANSFER NOTE
Patient: Janelle Allison    Procedure: Procedure(s):  ESOPHAGOGASTRODUODENOSCOPY WITH BIOPSIES       Diagnosis: Anemia [D64.9]  Diagnosis Additional Information: No value filed.    Anesthesia Type:   MAC     Note:    Oropharynx: oropharynx clear of all foreign objects  Level of Consciousness: drowsy  Oxygen Supplementation: face mask  Level of Supplemental Oxygen (L/min / FiO2): 6  Independent Airway: airway patency satisfactory and stable  Dentition: dentition unchanged  Vital Signs Stable: post-procedure vital signs reviewed and stable  Report to RN Given: handoff report given  Patient transferred to: Phase II    Handoff Report: Identifed the Patient, Identified the Reponsible Provider, Reviewed the pertinent medical history, Discussed the surgical course, Reviewed Intra-OP anesthesia mangement and issues during anesthesia, Set expectations for post-procedure period and Allowed opportunity for questions and acknowledgement of understanding      Vitals:  Vitals Value Taken Time   /76 03/20/24 0921   Temp 36.1  C (96.9  F) 03/20/24 0921   Pulse 76 03/20/24 0922   Resp 18 03/20/24 0921   SpO2 100 % 03/20/24 0922   Vitals shown include unfiled device data.    Electronically Signed By: AMBER Downing CRNA  March 20, 2024  9:23 AM

## 2024-03-20 NOTE — ANESTHESIA POSTPROCEDURE EVALUATION
Patient: Janelle Allison    Procedure: Procedure(s):  ESOPHAGOGASTRODUODENOSCOPY WITH BIOPSIES       Anesthesia Type:  MAC    Note:  Disposition: Outpatient   Postop Pain Control: Uneventful            Sign Out: Well controlled pain   PONV: No   Neuro/Psych: Uneventful            Sign Out: Acceptable/Baseline neuro status   Airway/Respiratory: Uneventful            Sign Out: Acceptable/Baseline resp. status   CV/Hemodynamics: Uneventful            Sign Out: Acceptable CV status; No obvious hypovolemia; No obvious fluid overload   Other NRE: NONE   DID A NON-ROUTINE EVENT OCCUR? No           Last vitals:  Vitals Value Taken Time   /77 03/20/24 0940   Temp 36.2  C (97.1  F) 03/20/24 0940   Pulse 75 03/20/24 0941   Resp 18 03/20/24 0940   SpO2 95 % 03/20/24 0941   Vitals shown include unfiled device data.    Electronically Signed By: Henrique Childress MD  March 20, 2024  10:59 AM

## 2024-03-20 NOTE — ANESTHESIA PREPROCEDURE EVALUATION
Anesthesia Pre-Procedure Evaluation    Patient: Janelle Allison   MRN: [id number] : 1960        Procedure : Procedure(s):  ESOPHAGOGASTRODUODENOSCOPY          Past Medical History:   Diagnosis Date    Arthritis     CPAP (continuous positive airway pressure) dependence     Double vision     Fibromyalgia     Gastro-oesophageal reflux disease     Hypertension     Migraines     RLS (restless legs syndrome)     Sleep apnea     cpap    Thyroid disease     Uncomplicated asthma       Past Surgical History:   Procedure Laterality Date    APPENDECTOMY      COLONOSCOPY      HAND SURGERY      OPEN REDUCTION INTERNAL FIXATION ORBIT BLOWOUT Right 3/12/2015    Procedure: OPEN REDUCTION INTERNAL FIXATION ORBIT BLOWOUT;  Surgeon: Carlos Eckert MD;  Location:  [facility]    RECESSION RESECTION (REPAIR STRABISMUS) Left 2016    Procedure: RECESSION RESECTION (REPAIR STRABISMUS);  Surgeon: Abhi Jorgensen MD;  Location: [facility] OR    SINUS SURGERY        Allergies   Allergen Reactions    Hydralazine Headache and Other (See Comments)     OHC Comment: Plus fever. ; OHC Reaction: Other    Plus fever.     Plus fever.    Azelastine      Made her feel like she had a cold    Fosamax [Alendronate] Other (See Comments)     Joint pain    Levaquin [Levofloxacin]     Molds & Smuts     Nizatidine     Ranitidine Diarrhea     Other Reaction(s): Not available    Atorvastatin Muscle Pain (Myalgia), Unknown and Other (See Comments)     Aching knees and sore joints.    Achy joints; OHC Comment: Aching knees and sore joints.; O[facility] Reaction: Other; O[facility] Reaction Type: Unspecified; O[facility] Severity: Low; [facility] Noted: 2016      Social History     Tobacco Use    Smoking status: Never    Smokeless tobacco: Never   Substance Use Topics    Alcohol use: Yes     Comment: occasional      Wt Readings from Last 1 Encounters:   24 102.1 kg (225 lb)        Anesthesia Evaluation   Pt has had prior anesthetic.     History of anesthetic complications  -  "PONV.      ROS/MED HX  ENT/Pulmonary:     (+) sleep apnea, uses CPAP,                    asthma                  Neurologic: Comment: RLS    (+)      migraines,                          Cardiovascular:     (+) Dyslipidemia - -   -  - -                                      METS/Exercise Tolerance:     Hematologic:     (+)      anemia,          Musculoskeletal:  - neg musculoskeletal ROS     GI/Hepatic:     (+) GERD, Asymptomatic on medication,                  Renal/Genitourinary:  - neg Renal ROS     Endo:     (+)          thyroid problem,  Chronic steroid usage for Asthma.  Obesity,       Psychiatric/Substance Use:     (+) psychiatric history anxiety and depression       Infectious Disease:  - neg infectious disease ROS     Malignancy:  - neg malignancy ROS     Other:  - neg other ROS          Physical Exam    Airway  airway exam normal      Mallampati: IV   TM distance: > 3 FB   Neck ROM: full   Mouth opening: > 3 cm    Respiratory Devices and Support         Dental  no notable dental history     (+) Modest Abnormalities - crowns, retainers, 1 or 2 missing teeth      Cardiovascular   cardiovascular exam normal       Rhythm and rate: regular and normal     Pulmonary   pulmonary exam normal        breath sounds clear to auscultation           OUTSIDE LABS:  CBC:   Lab Results   Component Value Date    WBC 7.5 02/08/2024    HGB 8.8 (L) 02/08/2024    HCT 30.1 (L) 02/08/2024     02/08/2024     BMP: No results found for: \"NA\", \"POTASSIUM\", \"CHLORIDE\", \"CO2\", \"BUN\", \"CR\", \"GLC\"  COAGS: No results found for: \"PTT\", \"INR\", \"FIBR\"  POC: No results found for: \"BGM\", \"HCG\", \"HCGS\"  HEPATIC: No results found for: \"ALBUMIN\", \"PROTTOTAL\", \"ALT\", \"AST\", \"GGT\", \"ALKPHOS\", \"BILITOTAL\", \"BILIDIRECT\", \"JEFF\"  OTHER:   Lab Results   Component Value Date    MAG 2.1 02/08/2024    TSH 1.61 03/15/2024    T4 1.38 03/15/2024    T3 84 (L) 03/15/2024       Anesthesia Plan    ASA Status:  3    NPO Status:  NPO Appropriate    Anesthesia " "Type: MAC.     - Reason for MAC: straight local not clinically adequate              Consents    Anesthesia Plan(s) and associated risks, benefits, and realistic alternatives discussed. Questions answered and patient/representative(s) expressed understanding.     - Discussed:     - Discussed with:  Patient            Postoperative Care    Pain management: IV analgesics, Oral pain medications, Multi-modal analgesia.   PONV prophylaxis: Ondansetron (or other 5HT-3), Dexamethasone or Solumedrol, Droperidol or Haldol     Comments:               Henrique Childress MD    I have reviewed the pertinent notes and labs in the chart from the past 30 days and (re)examined the patient.  Any updates or changes from those notes are reflected in this note.             # Drug Induced Platelet Defect: home medication list includes an antiplatelet medication  # Obesity: Estimated body mass index is 34.22 kg/m  as calculated from the following:    Height as of 2/19/24: 1.727 m (5' 7.99\").    Weight as of this encounter: 102.1 kg (225 lb).      "

## 2024-03-21 LAB
PATH REPORT.COMMENTS IMP SPEC: NORMAL
PATH REPORT.COMMENTS IMP SPEC: NORMAL
PATH REPORT.FINAL DX SPEC: NORMAL
PATH REPORT.GROSS SPEC: NORMAL
PATH REPORT.MICROSCOPIC SPEC OTHER STN: NORMAL
PATH REPORT.RELEVANT HX SPEC: NORMAL
PHOTO IMAGE: NORMAL

## 2024-03-28 NOTE — TELEPHONE ENCOUNTER
Should I close this encounter?    Tess Terry [facility] Endocrinology [place]  [phone]

## 2024-04-10 ENCOUNTER — HOSPITAL ENCOUNTER (EMERGENCY)
Facility: CLINIC | Age: 64
Discharge: LEFT BEFORE DISCHARGE | End: 2024-04-10
Admitting: EMERGENCY MEDICINE
Payer: COMMERCIAL

## 2024-04-10 VITALS
RESPIRATION RATE: 18 BRPM | DIASTOLIC BLOOD PRESSURE: 80 MMHG | TEMPERATURE: 97.4 F | OXYGEN SATURATION: 97 % | WEIGHT: 223 LBS | SYSTOLIC BLOOD PRESSURE: 137 MMHG | BODY MASS INDEX: 33.92 KG/M2 | HEART RATE: 94 BPM

## 2024-04-10 PROCEDURE — 99281 EMR DPT VST MAYX REQ PHY/QHP: CPT

## 2024-04-10 ASSESSMENT — COLUMBIA-SUICIDE SEVERITY RATING SCALE - C-SSRS
1. IN THE PAST MONTH, HAVE YOU WISHED YOU WERE DEAD OR WISHED YOU COULD GO TO SLEEP AND NOT WAKE UP?: NO
6. HAVE YOU EVER DONE ANYTHING, STARTED TO DO ANYTHING, OR PREPARED TO DO ANYTHING TO END YOUR LIFE?: NO
2. HAVE YOU ACTUALLY HAD ANY THOUGHTS OF KILLING YOURSELF IN THE PAST MONTH?: NO

## 2024-04-10 NOTE — ED TRIAGE NOTES
Pt to ER w c/o generalized abdominal pain that started around 1900. Pt states it started out as pressure and now feels sharp. Rates pain 10/10. Pt states she has intermittent nausea but no vomitting. VSS, A&Ox4, ABCs intact.

## 2024-04-22 ENCOUNTER — OFFICE VISIT (OUTPATIENT)
Dept: ENDOCRINOLOGY | Facility: CLINIC | Age: 64
End: 2024-04-22
Payer: COMMERCIAL

## 2024-04-22 ENCOUNTER — MEDICAL CORRESPONDENCE (OUTPATIENT)
Dept: HEALTH INFORMATION MANAGEMENT | Facility: CLINIC | Age: 64
End: 2024-04-22

## 2024-04-22 VITALS
RESPIRATION RATE: 18 BRPM | OXYGEN SATURATION: 94 % | DIASTOLIC BLOOD PRESSURE: 70 MMHG | BODY MASS INDEX: 34.77 KG/M2 | WEIGHT: 229.4 LBS | TEMPERATURE: 98.7 F | SYSTOLIC BLOOD PRESSURE: 125 MMHG | HEART RATE: 92 BPM | HEIGHT: 68 IN

## 2024-04-22 DIAGNOSIS — E03.9 HYPOTHYROIDISM, UNSPECIFIED TYPE: ICD-10-CM

## 2024-04-22 DIAGNOSIS — R61 GENERALIZED HYPERHIDROSIS: Primary | ICD-10-CM

## 2024-04-22 DIAGNOSIS — E11.9 TYPE 2 DIABETES MELLITUS WITHOUT COMPLICATION, WITHOUT LONG-TERM CURRENT USE OF INSULIN (H): ICD-10-CM

## 2024-04-22 PROCEDURE — 99214 OFFICE O/P EST MOD 30 MIN: CPT | Performed by: PHYSICIAN ASSISTANT

## 2024-04-22 RX ORDER — MEPOLIZUMAB 100 MG/ML
INJECTION, SOLUTION SUBCUTANEOUS
COMMUNITY
Start: 2024-04-08

## 2024-04-22 RX ORDER — GLYCOPYRROLATE 1 MG/1
1 TABLET ORAL 3 TIMES DAILY
Qty: 270 TABLET | Refills: 1 | Status: SHIPPED | OUTPATIENT
Start: 2024-04-22 | End: 2024-08-27 | Stop reason: SINTOL

## 2024-04-22 RX ORDER — PREDNISONE 20 MG/1
TABLET ORAL
COMMUNITY
Start: 2024-04-11 | End: 2024-04-22

## 2024-04-22 RX ORDER — LEVOTHYROXINE SODIUM 75 UG/1
CAPSULE ORAL
COMMUNITY
Start: 2024-03-15 | End: 2024-08-27

## 2024-04-22 RX ORDER — METFORMIN HCL 500 MG
TABLET, EXTENDED RELEASE 24 HR ORAL
COMMUNITY
Start: 2024-03-03

## 2024-04-22 RX ORDER — ELETRIPTAN HYDROBROMIDE 20 MG/1
TABLET, FILM COATED ORAL
COMMUNITY
Start: 2024-04-08

## 2024-04-22 RX ORDER — PREDNISONE 5 MG/1
TABLET ORAL
COMMUNITY
Start: 2024-03-03

## 2024-04-22 NOTE — TELEPHONE ENCOUNTER
Patient had appt today.    Tess Terry CMA  [facility] Endocrinology [place]  [phone]

## 2024-04-22 NOTE — PROGRESS NOTES
Assessment/Plan :   Hypothyroidism. Janelle has noticed an improvement with the lower dose of Tirosint. She feels like the 75 mcg dosing is working much better. Her energy level has improved and she feels like her mood is more stable. We reviewed her recent laboratory results and her thyroid levels looked great. We will continue with 75 mcg daily.  Hyperhidrosis. I was hopeful that the lower T4 dose would help improve her sweating. She has not noticed any difference. She is also worried about the increasing temperatures. She states that the sweating is really embarrassing and it affects her overall quality of life. We discussed options and we will try glycopyrrolate. She will start with 1 tablet twice daily and she can increase to three times daily, if she is tolerating the medication. Possible adverse effects include dry mouth and constipation. If she has any problems, she will contact my office.  Type 2 DM/obesity. Her blood sugars are great and she continues to do well with metformin. However, she would like to be able to lose weight. We discussed options regarding GLP1 medications. She will switch to Medicare next month and she would like to wait until after she has made the switch, to change medication. I did give her the names of some GLP1 medications to look into, as far as coverage. We will follow-up in 4-5 mos.       I have independently reviewed and interpreted labs, imaging as indicated.      Chief complaint:  Janelle is a 63 year old female who returns for follow-up of Type 2 DM, hypothyroidism, and hyperhidrosis.    I have reviewed Care Everywhere including [facility], [facility], [facility], [facility], [facility] , , [place] lab reports, imaging reports and provider notes as indicated.      HISTORY OF PRESENT ILLNESS  Janelle states that she has noticed an improvement since switching to Tirosint 75 mcg daily. She feels like her energy level and overall mood is more stable on  the lower dose. However, she is still sweating. She will still flush at random times throughout the day and then start sweating. It has really had an adverse effect on her overall quality of life.     Janelle has a complicated medical history that includes HTN, obesity, Type 2 DM, hyperlipidemia, ABIMAEL, asthma with long term use of prednisone, hypothyroidism, and iron deficient anemia. She recently underwent both a colonoscopy and endoscopy to try and locate any signs of gastritis or blood loss. Both of the procedures looked good. She also received three iron infusions. She continues to take metformin 1000 mg daily and she monitors her blood sugars with fingerstick testing. She has not had any problems with severe hyperglycemia and/or hypoglycemia. She has been a little frustrated with her inability to lose weight and she has been considering trying a new diabetes medication.    Endocrine relevant labs are as follows:   Latest Reference Range & Units 03/15/24 09:00   TSH 0.30 - 4.20 uIU/mL 1.61      Latest Reference Range & Units 03/15/24 09:00   T4 Free 0.90 - 1.70 ng/dL 1.38      Latest Reference Range & Units 09/27/23 08:50   Hemoglobin A1C (External) 0 - 5.7 % 5.9 (H) (E)   (H): Data is abnormally high  (E): External lab result    REVIEW OF SYSTEMS    Endocrine: positive for thyroid disorder, diabetes, and hyperhidrosis  Skin: positive for flushing and hyperhidrosis  Eyes: negative for, visual blurring, redness, tearing  Ears/Nose/Throat: negative  Respiratory: No shortness of breath, dyspnea on exertion, cough, or hemoptysis  Cardiovascular: negative for, irregular heart beat, chest pain, dyspnea on exertion, and lower extremity edema  Gastrointestinal: negative for, nausea, vomiting, constipation, and diarrhea  Genitourinary: negative for, nocturia, dysuria, frequency, and urgency  Musculoskeletal: negative for, muscular weakness, nocturnal cramping, and foot pain  Neurologic: negative for, local weakness,  numbness or tingling of hands, and numbness or tingling of feet  Psychiatric: negative  Hematologic/Lymphatic/Immunologic: negative    Past Medical History  Past Medical History:   Diagnosis Date    Arthritis     CPAP (continuous positive airway pressure) dependence     Double vision     Fibromyalgia     Gastro-oesophageal reflux disease     Hypertension     Migraines     RLS (restless legs syndrome)     Sleep apnea     cpap    Thyroid disease     Uncomplicated asthma        Medications  Current Outpatient Medications   Medication Sig Dispense Refill    albuterol (VENTOLIN HFA) 108 (90 BASE) MCG/ACT inhaler Inhale 2 puffs into the lungs every 6 hours as needed       aspirin 81 MG EC tablet Take 81 mg by mouth daily      Aspirin-Acetaminophen-Caffeine (MIGRAINE RELIEF PO) Take 1 tablet by mouth 2 times daily as needed      azithromycin (ZITHROMAX) 250 MG tablet Take 250 mg by mouth Every Mon, Wed, Fri Morning      baclofen (LIORESAL) 10 MG tablet TK 1 T PO TID WC AND WF PRF MUSCLE SPASM      beclomethasone HFA (QVAR REDIHALER) 80 MCG/ACT inhaler Inhale 1 puff into the lungs 2 times daily      blood glucose (TRUE METRIX BLOOD GLUCOSE TEST) test strip daily      buPROPion (WELLBUTRIN XL) 150 MG 24 hr tablet       Calcium Carbonate-Vitamin D (CALCIUM + D PO) Take 1 tablet by mouth three times a week       cetirizine (ZYRTEC) 10 MG tablet Take 10 mg by mouth daily      Cholecalciferol (VITAMIN D3 PO) Take 25 mcg by mouth daily      DILTIAZEM HCL PO Take 360 mg by mouth daily      DULoxetine (CYMBALTA) 60 MG capsule Take 120 mg by mouth daily      eletriptan (RELPAX) 20 MG tablet       fluticasone (FLONASE) 50 MCG/ACT nasal spray Spray 2 sprays into both nostrils 2 times daily      fluticasone-salmeterol (ADVAIR-HFA) 230-21 MCG/ACT inhaler Inhale 2 puffs into the lungs 2 times daily      guaiFENesin (MUCINEX) 600 MG 12 hr tablet Take 600 mg by mouth 2 times daily      Hypertonic Nasal Wash (SINUS RINSE KIT) PACK Spray in  nostril 2 times daily       ipratropium (ATROVENT) 0.03 % nasal spray Spray 2 sprays into both nostrils every 12 hours      levothyroxine (TIROSINT) 75 MCG capsule       metFORMIN (GLUCOPHAGE XR) 500 MG 24 hr tablet       montelukast (SINGULAIR) 10 MG tablet Take 10 mg by mouth At Bedtime      Multiple Vitamins-Minerals (CENTRUM ULTRA WOMENS PO)       NUCALA 100 MG/ML auto-injector       nystatin (MYCOSTATIN) 542578 UNIT/GM external powder       Pantoprazole Sodium (PROTONIX PO) Take 40 mg by mouth 2 times daily (before meals)      potassium chloride ER (MICRO-K) 10 MEQ CR capsule Take 1 capsule by mouth daily      pravastatin (PRAVACHOL) 10 MG tablet Take 1 tablet by mouth daily      predniSONE (DELTASONE) 5 MG tablet       senna-docusate (SENOKOT-S/PERICOLACE) 8.6-50 MG tablet Take 1 tablet by mouth 2 times daily as needed      sucralfate (CARAFATE) 1 GM tablet Take 1 g by mouth 4 times daily as needed      umeclidinium (INCRUSE ELLIPTA) 62.5 MCG/ACT inhaler Inhale 1 puff into the lungs daily      valsartan-hydrochlorothiazide (DIOVAN-HCT) 320-25 MG per tablet Take 1 tablet by mouth daily         Allergies  Allergies   Allergen Reactions    Hydralazine Headache and Other (See Comments)     OHC Comment: Plus fever. ; OHC Reaction: Other    Plus fever.     Plus fever.    Azelastine      Made her feel like she had a cold    Fosamax [Alendronate] Other (See Comments)     Joint pain    Levaquin [Levofloxacin]     Molds & Smuts     Nizatidine     Ranitidine Diarrhea     Other Reaction(s): Not available    Atorvastatin Muscle Pain (Myalgia), Unknown and Other (See Comments)     Aching knees and sore joints.    Achy joints; OHC Comment: Aching knees and sore joints.; OHC Reaction: Other; OHC Reaction Type: Unspecified; OHC Severity: Low; OHC Noted: 20161101         Family History  family history is not on file.    Social History  Social History     Tobacco Use    Smoking status: Never    Smokeless tobacco: Never  "  Substance Use Topics    Alcohol use: Yes     Comment: occasional    Drug use: No       Physical Exam  /70 (BP Location: Left arm, Patient Position: Chair, Cuff Size: Adult Large)   Pulse 92   Temp 98.7  F (37.1  C) (Tympanic)   Resp 18   Ht 1.727 m (5' 7.99\")   Wt 104.1 kg (229 lb 6.4 oz)   LMP  (LMP Unknown)   SpO2 94%   Breastfeeding No   BMI 34.89 kg/m    Body mass index is 34.89 kg/m .  GENERAL :  In no apparent distress  SKIN: Normal color, normal temperature, texture.  No hirsutism, alopecia or purple striae.  Skin is flushed with evidence of hyperhidrosis   EYES: PERRL, EOMI, No scleral icterus,  No proptosis, conjunctival redness, stare, retraction  RESP: Lungs clear to auscultation bilaterally  CARDIAC: Regular rate and rhythm, normal S1 S2, without murmurs, rubs or gallops    NEURO: awake, alert, responds appropriately to questions.  Cranial nerves intact.   Moves all extremities; Gait normal.  No tremor of the outstretched hand.    EXTREMITIES: No clubbing, cyanosis or edema.    DATA REVIEW  [address] 105 mg/dl  [address] 122 mg/dl      "

## 2024-04-22 NOTE — PATIENT INSTRUCTIONS
[facility]  Dr Diaz, Endocrinology Department    [address] [id number]  [place] [address]  Appointment [phone]  Fax: [phone]  [place]: Monday - Thursday

## 2024-04-22 NOTE — LETTER
4/22/2024         RE: Janelle Allison  [address] [address]        Dear Colleague,    Thank you for referring your patient, Janelle Allison, to the [facility]. Please see a copy of my visit note below.    Assessment/Plan :   Hypothyroidism. Janelle has noticed an improvement with the lower dose of Tirosint. She feels like the 75 mcg dosing is working much better. Her energy level has improved and she feels like her mood is more stable. We reviewed her recent laboratory results and her thyroid levels looked great. We will continue with 75 mcg daily.  Hyperhidrosis. I was hopeful that the lower T4 dose would help improve her sweating. She has not noticed any difference. She is also worried about the increasing temperatures. She states that the sweating is really embarrassing and it affects her overall quality of life. We discussed options and we will try glycopyrrolate. She will start with 1 tablet twice daily and she can increase to three times daily, if she is tolerating the medication. Possible adverse effects include dry mouth and constipation. If she has any problems, she will contact my office.  Type 2 DM/obesity. Her blood sugars are great and she continues to do well with metformin. However, she would like to be able to lose weight. We discussed options regarding GLP1 medications. She will switch to Medicare next month and she would like to wait until after she has made the switch, to change medication. I did give her the names of some GLP1 medications to look into, as far as coverage. We will follow-up in 4-5 mos.       I have independently reviewed and interpreted labs, imaging as indicated.      Chief complaint:  Janelle is a 63 year old female who returns for follow-up of Type 2 DM, hypothyroidism, and hyperhidrosis.    I have reviewed Care Everywhere including [facility], [facility], [facility], [facility] [place], [facility], [facility] , [facility],  [place] lab reports, imaging reports and provider notes as indicated.      HISTORY OF PRESENT ILLNESS  Janelle states that she has noticed an improvement since switching to Tirosint 75 mcg daily. She feels like her energy level and overall mood is more stable on the lower dose. However, she is still sweating. She will still flush at random times throughout the day and then start sweating. It has really had an adverse effect on her overall quality of life.     Janelle has a complicated medical history that includes HTN, obesity, Type 2 DM, hyperlipidemia, ABIMAEL, asthma with long term use of prednisone, hypothyroidism, and iron deficient anemia. She recently underwent both a colonoscopy and endoscopy to try and locate any signs of gastritis or blood loss. Both of the procedures looked good. She also received three iron infusions. She continues to take metformin 1000 mg daily and she monitors her blood sugars with fingerstick testing. She has not had any problems with severe hyperglycemia and/or hypoglycemia. She has been a little frustrated with her inability to lose weight and she has been considering trying a new diabetes medication.    Endocrine relevant labs are as follows:   Latest Reference Range & Units 03/15/24 09:00   TSH 0.30 - 4.20 uIU/mL 1.61      Latest Reference Range & Units 03/15/24 09:00   T4 Free 0.90 - 1.70 ng/dL 1.38      Latest Reference Range & Units 09/27/23 08:50   Hemoglobin A1C (External) 0 - 5.7 % 5.9 (H) (E)   (H): Data is abnormally high  (E): External lab result    REVIEW OF SYSTEMS    Endocrine: positive for thyroid disorder, diabetes, and hyperhidrosis  Skin: positive for flushing and hyperhidrosis  Eyes: negative for, visual blurring, redness, tearing  Ears/Nose/Throat: negative  Respiratory: No shortness of breath, dyspnea on exertion, cough, or hemoptysis  Cardiovascular: negative for, irregular heart beat, chest pain, dyspnea on exertion, and lower extremity edema  Gastrointestinal:  negative for, nausea, vomiting, constipation, and diarrhea  Genitourinary: negative for, nocturia, dysuria, frequency, and urgency  Musculoskeletal: negative for, muscular weakness, nocturnal cramping, and foot pain  Neurologic: negative for, local weakness, numbness or tingling of hands, and numbness or tingling of feet  Psychiatric: negative  Hematologic/Lymphatic/Immunologic: negative    Past Medical History  Past Medical History:   Diagnosis Date     Arthritis      CPAP (continuous positive airway pressure) dependence      Double vision      Fibromyalgia      Gastro-oesophageal reflux disease      Hypertension      Migraines      RLS (restless legs syndrome)      Sleep apnea     cpap     Thyroid disease      Uncomplicated asthma        Medications  Current Outpatient Medications   Medication Sig Dispense Refill     albuterol (VENTOLIN HFA) 108 (90 BASE) MCG/ACT inhaler Inhale 2 puffs into the lungs every 6 hours as needed        aspirin 81 MG EC tablet Take 81 mg by mouth daily       Aspirin-Acetaminophen-Caffeine (MIGRAINE RELIEF PO) Take 1 tablet by mouth 2 times daily as needed       azithromycin (ZITHROMAX) 250 MG tablet Take 250 mg by mouth Every Mon, Wed, Fri Morning       baclofen (LIORESAL) 10 MG tablet TK 1 T PO TID WC AND WF PRF MUSCLE SPASM       beclomethasone HFA (QVAR REDIHALER) 80 MCG/ACT inhaler Inhale 1 puff into the lungs 2 times daily       blood glucose (TRUE METRIX BLOOD GLUCOSE TEST) test strip daily       buPROPion (WELLBUTRIN XL) 150 MG 24 hr tablet        Calcium Carbonate-Vitamin D (CALCIUM + D PO) Take 1 tablet by mouth three times a week        cetirizine (ZYRTEC) 10 MG tablet Take 10 mg by mouth daily       Cholecalciferol (VITAMIN D3 PO) Take 25 mcg by mouth daily       DILTIAZEM HCL PO Take 360 mg by mouth daily       DULoxetine (CYMBALTA) 60 MG capsule Take 120 mg by mouth daily       eletriptan (RELPAX) 20 MG tablet        fluticasone (FLONASE) 50 MCG/ACT nasal spray Spray 2  sprays into both nostrils 2 times daily       fluticasone-salmeterol (ADVAIR-HFA) 230-21 MCG/ACT inhaler Inhale 2 puffs into the lungs 2 times daily       guaiFENesin (MUCINEX) 600 MG 12 hr tablet Take 600 mg by mouth 2 times daily       Hypertonic Nasal Wash (SINUS RINSE KIT) PACK [place] in nostril 2 times daily        ipratropium (ATROVENT) 0.03 % nasal spray Spray 2 sprays into both nostrils every 12 hours       levothyroxine (TIROSINT) 75 MCG capsule        metFORMIN (GLUCOPHAGE XR) 500 MG 24 hr tablet        montelukast (SINGULAIR) 10 MG tablet Take 10 mg by mouth At Bedtime       Multiple Vitamins-Minerals (CENTRUM ULTRA WOMENS PO)        NUCALA 100 MG/ML auto-injector        nystatin (MYCOSTATIN) 937345 UNIT/GM external powder        Pantoprazole Sodium (PROTONIX PO) Take 40 mg by mouth 2 times daily (before meals)       potassium chloride ER (MICRO-K) 10 MEQ CR capsule Take 1 capsule by mouth daily       pravastatin (PRAVACHOL) 10 MG tablet Take 1 tablet by mouth daily       predniSONE (DELTASONE) 5 MG tablet        senna-docusate (SENOKOT-S/PERICOLACE) 8.6-50 MG tablet Take 1 tablet by mouth 2 times daily as needed       sucralfate (CARAFATE) 1 GM tablet Take 1 g by mouth 4 times daily as needed       umeclidinium (INCRUSE ELLIPTA) 62.5 MCG/ACT inhaler Inhale 1 puff into the lungs daily       valsartan-hydrochlorothiazide (DIOVAN-HCT) 320-25 MG per tablet Take 1 tablet by mouth daily         Allergies  Allergies   Allergen Reactions     Hydralazine Headache and Other (See Comments)     OHC Comment: Plus fever. ; OHC Reaction: Other    Plus fever.     Plus fever.     Azelastine      Made her feel like she had a cold     Fosamax [Alendronate] Other (See Comments)     Joint pain     Levaquin [Levofloxacin]      Molds & Smuts      Nizatidine      Ranitidine Diarrhea     Other Reaction(s): Not available     Atorvastatin Muscle Pain (Myalgia), Unknown and Other (See Comments)     Aching knees and sore  "joints.    Achy joints; OHC Comment: Aching knees and sore joints.; OHC Reaction: Other; OHC Reaction Type: Unspecified; OHC Severity: Low; OHC Noted: 20161101         Family History  family history is not on file.    Social History  Social History     Tobacco Use     Smoking status: Never     Smokeless tobacco: Never   Substance Use Topics     Alcohol use: Yes     Comment: occasional     Drug use: No       Physical Exam  /70 (BP Location: Left arm, Patient Position: Chair, Cuff Size: Adult Large)   Pulse 92   Temp 98.7  F (37.1  C) (Tympanic)   Resp 18   Ht 1.727 m (5' 7.99\")   Wt 104.1 kg (229 lb 6.4 oz)   LMP  (LMP Unknown)   SpO2 94%   Breastfeeding No   BMI 34.89 kg/m    Body mass index is 34.89 kg/m .  GENERAL :  In no apparent distress  SKIN: Normal color, normal temperature, texture.  No hirsutism, alopecia or purple striae.  Skin is flushed with evidence of hyperhidrosis   EYES: PERRL, EOMI, No scleral icterus,  No proptosis, conjunctival redness, stare, retraction  RESP: Lungs clear to auscultation bilaterally  CARDIAC: Regular rate and rhythm, normal S1 S2, without murmurs, rubs or gallops    NEURO: awake, alert, responds appropriately to questions.  Cranial nerves intact.   Moves all extremities; Gait normal.  No tremor of the outstretched hand.    EXTREMITIES: No clubbing, cyanosis or edema.    DATA REVIEW  [address] 105 mg/dl  [address] 122 mg/dl        Again, thank you for allowing me to participate in the care of your patient.        Sincerely,        Neli Blanchard PA-C  "

## 2024-04-28 ENCOUNTER — HEALTH MAINTENANCE LETTER (OUTPATIENT)
Age: 64
End: 2024-04-28

## 2024-06-17 ENCOUNTER — TRANSFERRED RECORDS (OUTPATIENT)
Dept: SURGERY | Facility: CLINIC | Age: 64
End: 2024-06-17
Payer: MEDICARE

## 2024-06-17 ENCOUNTER — MEDICAL CORRESPONDENCE (OUTPATIENT)
Dept: SURGERY | Facility: CLINIC | Age: 64
End: 2024-06-17
Payer: MEDICARE

## 2024-07-07 ENCOUNTER — HEALTH MAINTENANCE LETTER (OUTPATIENT)
Age: 64
End: 2024-07-07

## 2024-08-01 ENCOUNTER — LAB REQUISITION (OUTPATIENT)
Dept: LAB | Facility: CLINIC | Age: 64
End: 2024-08-01

## 2024-08-01 DIAGNOSIS — D50.8 OTHER IRON DEFICIENCY ANEMIAS: ICD-10-CM

## 2024-08-01 LAB
IRON BINDING CAPACITY (ROCHE): 380 UG/DL (ref 240–430)
IRON SATN MFR SERPL: 14 % (ref 15–46)
IRON SERPL-MCNC: 54 UG/DL (ref 37–145)

## 2024-08-01 PROCEDURE — 82728 ASSAY OF FERRITIN: CPT | Performed by: STUDENT IN AN ORGANIZED HEALTH CARE EDUCATION/TRAINING PROGRAM

## 2024-08-01 PROCEDURE — 83550 IRON BINDING TEST: CPT | Performed by: STUDENT IN AN ORGANIZED HEALTH CARE EDUCATION/TRAINING PROGRAM

## 2024-08-02 LAB — FERRITIN SERPL-MCNC: 12 NG/ML (ref 11–328)

## 2024-08-27 ENCOUNTER — OFFICE VISIT (OUTPATIENT)
Dept: ENDOCRINOLOGY | Facility: CLINIC | Age: 64
End: 2024-08-27
Payer: MEDICARE

## 2024-08-27 VITALS
OXYGEN SATURATION: 95 % | HEART RATE: 89 BPM | HEIGHT: 68 IN | WEIGHT: 227.9 LBS | DIASTOLIC BLOOD PRESSURE: 64 MMHG | BODY MASS INDEX: 34.54 KG/M2 | TEMPERATURE: 98.8 F | SYSTOLIC BLOOD PRESSURE: 128 MMHG | RESPIRATION RATE: 18 BRPM

## 2024-08-27 DIAGNOSIS — E03.9 HYPOTHYROIDISM, UNSPECIFIED TYPE: ICD-10-CM

## 2024-08-27 DIAGNOSIS — R61 GENERALIZED HYPERHIDROSIS: Primary | ICD-10-CM

## 2024-08-27 DIAGNOSIS — E11.9 TYPE 2 DIABETES MELLITUS WITHOUT COMPLICATION, WITHOUT LONG-TERM CURRENT USE OF INSULIN (H): ICD-10-CM

## 2024-08-27 PROCEDURE — 99214 OFFICE O/P EST MOD 30 MIN: CPT | Performed by: PHYSICIAN ASSISTANT

## 2024-08-27 RX ORDER — LEVOTHYROXINE SODIUM 75 UG/1
TABLET ORAL
COMMUNITY
Start: 2024-06-06

## 2024-08-27 RX ORDER — PYRIDOXINE HCL (VITAMIN B6) 25 MG
TABLET ORAL
COMMUNITY

## 2024-08-27 RX ORDER — DILTIAZEM HYDROCHLORIDE 360 MG/1
360 CAPSULE, EXTENDED RELEASE ORAL DAILY
COMMUNITY
Start: 2024-08-02

## 2024-08-27 RX ORDER — BACLOFEN 20 MG
1 TABLET ORAL DAILY
COMMUNITY

## 2024-08-27 RX ORDER — FLUTICASONE FUROATE 100 UG/1
POWDER RESPIRATORY (INHALATION)
COMMUNITY
Start: 2024-08-26

## 2024-08-27 RX ORDER — PANTOPRAZOLE SODIUM 40 MG/1
TABLET, DELAYED RELEASE ORAL
COMMUNITY
Start: 2024-08-14

## 2024-08-27 RX ORDER — CELECOXIB 100 MG/1
100 CAPSULE ORAL
COMMUNITY
Start: 2024-06-06

## 2024-08-27 NOTE — PROGRESS NOTES
Assessment/Plan :   Hyperhidrosis. We had a long discussion regarding her ongoing issues with sweating. At this point in time, I do not think it is related to any hormonal abnormalities. Her thyroid level is very stable and her hemoglobin A1C looks great. We discussed other anticholinergic medications but she is really worried about worsening constipation. We also discussed that it may be related to either the Cymbalta, Wellbutrin XL, or a combination of the two. I do not feel comfortable adjusting these medications and I recommend that she follow-up with her primary care provider, Dr. Shearer, to discus options. Janelle will follow-up with our office, as needed.      I have independently reviewed and interpreted labs, imaging as indicated.      Chief complaint:  Janelle is a 64 year old female who returns for follow-up of hyperhidrosis, hypothyroidism, and Type 2 DM.    I have reviewed Care Everywhere including [facility], [facility], [facility], [facility], [facility] , [facility], [place] lab reports, imaging reports and provider notes as indicated.      HISTORY OF PRESENT ILLNESS  Janelle continues to have trouble with excessive sweating. At our last visit, we decided to try glycopyrrolate. She could not tolerate this medication. She states that it immediately caused dry, cotton mouth like symptoms. She already struggles with constipation and she was worried that further use would worsen her constipation, as well. Since our last visit, she has also been struggling with eosinophilic asthma. She doesn't feel like this contributes to her sweating. She is not currently monitoring her blood sugars because the numbers are always stable.    Janelle has a complicated medical history that includes HTN, obesity, Type 2 DM, hyperlipidemia, ABIMAEL, asthma with long term use of prednisone, hypothyroidism, and iron deficient anemia. She recently underwent both a colonoscopy and endoscopy to try and locate any signs  of gastritis or blood loss. Both of the procedures looked good. She also received three iron infusions. She continues to take metformin 1000 mg daily and she monitors her blood sugars with fingerstick testing.     Endocrine relevant labs are as follows:   Latest Reference Range & Units 09/27/23 08:50   Hemoglobin A1C (External) 0 - 5.7 % 5.9 (H) (E)   (H): Data is abnormally high  (E): External lab result   Latest Reference Range & Units 03/15/24 09:00   TSH 0.30 - 4.20 uIU/mL 1.61      Latest Reference Range & Units 03/15/24 09:00   T4 Free 0.90 - 1.70 ng/dL 1.38       REVIEW OF SYSTEMS    Endocrine: positive for thyroid disorder and diabetes  Skin: positive for hyperhidrosis, negative for, hair loss on scalp, hyperpigmentation  Eyes: negative for, visual blurring, redness, tearing  Ears/Nose/Throat: negative  Respiratory: No shortness of breath, dyspnea on exertion, cough, or hemoptysis  Cardiovascular: negative for, irregular heart beat, chest pain, dyspnea on exertion, lower extremity edema, and exercise intolerance  Gastrointestinal: negative for, nausea, vomiting, constipation, and diarrhea  Genitourinary: negative for, nocturia, dysuria, frequency, and urgency  Musculoskeletal: negative for, muscular weakness, nocturnal cramping, and foot pain  Neurologic: negative for, numbness or tingling of hands, and numbness or tingling of feet  Psychiatric: negative  Hematologic/Lymphatic/Immunologic: negative    Past Medical History  Past Medical History:   Diagnosis Date    Arthritis     CPAP (continuous positive airway pressure) dependence     Double vision     Fibromyalgia     Gastro-oesophageal reflux disease     Hypertension     Migraines     RLS (restless legs syndrome)     Sleep apnea     cpap    Thyroid disease     Uncomplicated asthma        Medications  Current Outpatient Medications   Medication Sig Dispense Refill    albuterol (VENTOLIN HFA) 108 (90 BASE) MCG/ACT inhaler Inhale 2 puffs into the lungs every  6 hours as needed       aspirin 81 MG EC tablet Take 81 mg by mouth daily      Aspirin-Acetaminophen-Caffeine (MIGRAINE RELIEF PO) Take 1 tablet by mouth 2 times daily as needed      azithromycin (ZITHROMAX) 250 MG tablet Take 250 mg by mouth Every Mon, Wed, Fri Morning      baclofen (LIORESAL) 10 MG tablet TK 1 T PO TID WC AND WF PRF MUSCLE SPASM      beclomethasone HFA (QVAR REDIHALER) 80 MCG/ACT inhaler Inhale 1 puff into the lungs 2 times daily      blood glucose (TRUE METRIX BLOOD GLUCOSE TEST) test strip daily      buPROPion (WELLBUTRIN XL) 150 MG 24 hr tablet       Calcium Carbonate-Vitamin D (CALCIUM + D PO) Take 1 tablet by mouth three times a week       cetirizine (ZYRTEC) 10 MG tablet Take 10 mg by mouth daily      Cholecalciferol (VITAMIN D3 PO) Take 25 mcg by mouth daily      DILTIAZEM HCL PO Take 360 mg by mouth daily      DULoxetine (CYMBALTA) 60 MG capsule Take 120 mg by mouth daily      eletriptan (RELPAX) 20 MG tablet       fluticasone (FLONASE) 50 MCG/ACT nasal spray Spray 2 sprays into both nostrils 2 times daily      fluticasone-salmeterol (ADVAIR-HFA) 230-21 MCG/ACT inhaler Inhale 2 puffs into the lungs 2 times daily      glycopyrrolate (ROBINUL) 1 MG tablet Take 1 tablet (1 mg) by mouth 3 times daily 270 tablet 1    guaiFENesin (MUCINEX) 600 MG 12 hr tablet Take 600 mg by mouth 2 times daily      Hypertonic Nasal Wash (SINUS RINSE KIT) PACK [place] in nostril 2 times daily       ipratropium (ATROVENT) 0.03 % nasal spray Spray 2 sprays into both nostrils every 12 hours      levothyroxine (TIROSINT) 75 MCG capsule       metFORMIN (GLUCOPHAGE XR) 500 MG 24 hr tablet       montelukast (SINGULAIR) 10 MG tablet Take 10 mg by mouth At Bedtime      Multiple Vitamins-Minerals (CENTRUM ULTRA WOMENS PO)       NUCALA 100 MG/ML auto-injector       nystatin (MYCOSTATIN) 903038 UNIT/GM external powder       Pantoprazole Sodium (PROTONIX PO) Take 40 mg by mouth 2 times daily (before meals)      potassium  "chloride ER (MICRO-K) 10 MEQ CR capsule Take 1 capsule by mouth daily      pravastatin (PRAVACHOL) 10 MG tablet Take 1 tablet by mouth daily      predniSONE (DELTASONE) 5 MG tablet       senna-docusate (SENOKOT-S/PERICOLACE) 8.6-50 MG tablet Take 1 tablet by mouth 2 times daily as needed      sucralfate (CARAFATE) 1 GM tablet Take 1 g by mouth 4 times daily as needed      umeclidinium (INCRUSE ELLIPTA) 62.5 MCG/ACT inhaler Inhale 1 puff into the lungs daily      valsartan-hydrochlorothiazide (DIOVAN-HCT) 320-25 MG per tablet Take 1 tablet by mouth daily         Allergies  Allergies   Allergen Reactions    Hydralazine Headache and Other (See Comments)     OHC Comment: Plus fever. ; OHC Reaction: Other    Plus fever.     Plus fever.    Azelastine      Made her feel like she had a cold    Fosamax [Alendronate] Other (See Comments)     Joint pain    Levaquin [Levofloxacin]     Molds & Smuts     Nizatidine     Ranitidine Diarrhea     Other Reaction(s): Not available    Atorvastatin Muscle Pain (Myalgia), Unknown and Other (See Comments)     Aching knees and sore joints.    Achy joints; OHC Comment: Aching knees and sore joints.; O[facility] Reaction: Other; O[facility] Reaction Type: Unspecified; OHC Severity: Low; OHC Noted: 20161101         Family History  family history is not on file.    Social History  Social History     Tobacco Use    Smoking status: Never    Smokeless tobacco: Never   Substance Use Topics    Alcohol use: Yes     Comment: occasional    Drug use: No       Physical Exam  /64 (BP Location: Left arm, Patient Position: Chair, Cuff Size: Adult Regular)   Pulse 89   Temp 98.8  F (37.1  C) (Tympanic)   Resp 18   Ht 1.727 m (5' 7.99\")   Wt 103.4 kg (227 lb 14.4 oz)   LMP  (LMP Unknown)   SpO2 95%   Breastfeeding No   BMI 34.66 kg/m    Body mass index is 34.66 kg/m .  GENERAL :  In no apparent distress  SKIN: Normal color, normal temperature, texture.  No hirsutism, alopecia or purple striae.  She is " diaphoretic  EYES: PERRL, EOMI, No scleral icterus,  No proptosis, conjunctival redness, stare, retraction  RESP: Lungs clear to auscultation bilaterally  CARDIAC: Regular rate and rhythm, normal S1 S2, without murmurs, rubs or gallops    NEURO: awake, alert, responds appropriately to questions.  Cranial nerves intact.   Moves all extremities; Gait normal.  No tremor of the outstretched hand.    EXTREMITIES: No clubbing, cyanosis or edema.    DATA REVIEW  She does not monitor her blood sugars consistently

## 2024-08-27 NOTE — PATIENT INSTRUCTIONS
[facility]  Dr Diaz, Endocrinology Department    [address] [id number]  [place] [address]  Appointment [phone]  Fax: [phone]  [place]: Monday - Thursday

## 2024-08-27 NOTE — LETTER
8/27/2024      Janelle Allison  [address]  [place] [address]      Dear Colleague,    Thank you for referring your patient, Janelle Allison, to the [facility]. Please see a copy of my visit note below.    Assessment/Plan :   Hyperhidrosis. We had a long discussion regarding her ongoing issues with sweating. At this point in time, I do not think it is related to any hormonal abnormalities. Her thyroid level is very stable and her hemoglobin A1C looks great. We discussed other anticholinergic medications but she is really worried about worsening constipation. We also discussed that it may be related to either the Cymbalta, Wellbutrin XL, or a combination of the two. I do not feel comfortable adjusting these medications and I recommend that she follow-up with her primary care provider, Dr. Shearer, to discus options. Janelle will follow-up with our office, as needed.      I have independently reviewed and interpreted labs, imaging as indicated.      Chief complaint:  Janelle is a 64 year old female who returns for follow-up of hyperhidrosis, hypothyroidism, and Type 2 DM.    I have reviewed Care Everywhere including [facility], [facility], [facility], [facility], [facility] , [facility], [place] lab reports, imaging reports and provider notes as indicated.      HISTORY OF PRESENT ILLNESS  Janelle continues to have trouble with excessive sweating. At our last visit, we decided to try glycopyrrolate. She could not tolerate this medication. She states that it immediately caused dry, cotton mouth like symptoms. She already struggles with constipation and she was worried that further use would worsen her constipation, as well. Since our last visit, she has also been struggling with eosinophilic asthma. She doesn't feel like this contributes to her sweating. She is not currently monitoring her blood sugars because the numbers are always stable.    Janelle has a  complicated medical history that includes HTN, obesity, Type 2 DM, hyperlipidemia, ABIMAEL, asthma with long term use of prednisone, hypothyroidism, and iron deficient anemia. She recently underwent both a colonoscopy and endoscopy to try and locate any signs of gastritis or blood loss. Both of the procedures looked good. She also received three iron infusions. She continues to take metformin 1000 mg daily and she monitors her blood sugars with fingerstick testing.     Endocrine relevant labs are as follows:   Latest Reference Range & Units 09/27/23 08:50   Hemoglobin A1C (External) 0 - 5.7 % 5.9 (H) (E)   (H): Data is abnormally high  (E): External lab result   Latest Reference Range & Units 03/15/24 09:00   TSH 0.30 - 4.20 uIU/mL 1.61      Latest Reference Range & Units 03/15/24 09:00   T4 Free 0.90 - 1.70 ng/dL 1.38       REVIEW OF SYSTEMS    Endocrine: positive for thyroid disorder and diabetes  Skin: positive for hyperhidrosis, negative for, hair loss on scalp, hyperpigmentation  Eyes: negative for, visual blurring, redness, tearing  Ears/Nose/Throat: negative  Respiratory: No shortness of breath, dyspnea on exertion, cough, or hemoptysis  Cardiovascular: negative for, irregular heart beat, chest pain, dyspnea on exertion, lower extremity edema, and exercise intolerance  Gastrointestinal: negative for, nausea, vomiting, constipation, and diarrhea  Genitourinary: negative for, nocturia, dysuria, frequency, and urgency  Musculoskeletal: negative for, muscular weakness, nocturnal cramping, and foot pain  Neurologic: negative for, numbness or tingling of hands, and numbness or tingling of feet  Psychiatric: negative  Hematologic/Lymphatic/Immunologic: negative    Past Medical History  Past Medical History:   Diagnosis Date     Arthritis      CPAP (continuous positive airway pressure) dependence      Double vision      Fibromyalgia      Gastro-oesophageal reflux disease      Hypertension      Migraines      RLS  (restless legs syndrome)      Sleep apnea     cpap     Thyroid disease      Uncomplicated asthma        Medications  Current Outpatient Medications   Medication Sig Dispense Refill     albuterol (VENTOLIN HFA) 108 (90 BASE) MCG/ACT inhaler Inhale 2 puffs into the lungs every 6 hours as needed        aspirin 81 MG EC tablet Take 81 mg by mouth daily       Aspirin-Acetaminophen-Caffeine (MIGRAINE RELIEF PO) Take 1 tablet by mouth 2 times daily as needed       azithromycin (ZITHROMAX) 250 MG tablet Take 250 mg by mouth Every Mon, Wed, Fri Morning       baclofen (LIORESAL) 10 MG tablet TK 1 T PO TID WC AND WF PRF MUSCLE SPASM       beclomethasone HFA (QVAR REDIHALER) 80 MCG/ACT inhaler Inhale 1 puff into the lungs 2 times daily       blood glucose (TRUE METRIX BLOOD GLUCOSE TEST) test strip daily       buPROPion (WELLBUTRIN XL) 150 MG 24 hr tablet        Calcium Carbonate-Vitamin D (CALCIUM + D PO) Take 1 tablet by mouth three times a week        cetirizine (ZYRTEC) 10 MG tablet Take 10 mg by mouth daily       Cholecalciferol (VITAMIN D3 PO) Take 25 mcg by mouth daily       DILTIAZEM HCL PO Take 360 mg by mouth daily       DULoxetine (CYMBALTA) 60 MG capsule Take 120 mg by mouth daily       eletriptan (RELPAX) 20 MG tablet        fluticasone (FLONASE) 50 MCG/ACT nasal spray Spray 2 sprays into both nostrils 2 times daily       fluticasone-salmeterol (ADVAIR-HFA) 230-21 MCG/ACT inhaler Inhale 2 puffs into the lungs 2 times daily       glycopyrrolate (ROBINUL) 1 MG tablet Take 1 tablet (1 mg) by mouth 3 times daily 270 tablet 1     guaiFENesin (MUCINEX) 600 MG 12 hr tablet Take 600 mg by mouth 2 times daily       Hypertonic Nasal Wash (SINUS RINSE KIT) PACK [place] in nostril 2 times daily        ipratropium (ATROVENT) 0.03 % nasal spray Spray 2 sprays into both nostrils every 12 hours       levothyroxine (TIROSINT) 75 MCG capsule        metFORMIN (GLUCOPHAGE XR) 500 MG 24 hr tablet        montelukast (SINGULAIR) 10 MG  tablet Take 10 mg by mouth At Bedtime       Multiple Vitamins-Minerals (CENTRUM ULTRA WOMENS PO)        NUCALA 100 MG/ML auto-injector        nystatin (MYCOSTATIN) [id number] UNIT/GM external powder        Pantoprazole Sodium (PROTONIX PO) Take 40 mg by mouth 2 times daily (before meals)       potassium chloride ER (MICRO-K) 10 MEQ CR capsule Take 1 capsule by mouth daily       pravastatin (PRAVACHOL) 10 MG tablet Take 1 tablet by mouth daily       predniSONE (DELTASONE) 5 MG tablet        senna-docusate (SENOKOT-S/PERICOLACE) 8.6-50 MG tablet Take 1 tablet by mouth 2 times daily as needed       sucralfate (CARAFATE) 1 GM tablet Take 1 g by mouth 4 times daily as needed       umeclidinium (INCRUSE ELLIPTA) 62.5 MCG/ACT inhaler Inhale 1 puff into the lungs daily       valsartan-hydrochlorothiazide (DIOVAN-HCT) 320-25 MG per tablet Take 1 tablet by mouth daily         Allergies  Allergies   Allergen Reactions     Hydralazine Headache and Other (See Comments)     OHC Comment: Plus fever. ; OHC Reaction: Other    Plus fever.     Plus fever.     Azelastine      Made her feel like she had a cold     Fosamax [Alendronate] Other (See Comments)     Joint pain     Levaquin [Levofloxacin]      Molds & Smuts      Nizatidine      Ranitidine Diarrhea     Other Reaction(s): Not available     Atorvastatin Muscle Pain (Myalgia), Unknown and Other (See Comments)     Aching knees and sore joints.    Achy joints; OHC Comment: Aching knees and sore joints.; OHC Reaction: Other; OHC Reaction Type: Unspecified; OHC Severity: Low; OHC Noted: 20161101         Family History  family history is not on file.    Social History  Social History     Tobacco Use     Smoking status: Never     Smokeless tobacco: Never   Substance Use Topics     Alcohol use: Yes     Comment: occasional     Drug use: No       Physical Exam  /64 (BP Location: Left arm, Patient Position: Chair, Cuff Size: Adult Regular)   Pulse 89   Temp 98.8  F (37.1  C) (Tympanic)   " Resp 18   Ht 1.727 m (5' 7.99\")   Wt 103.4 kg (227 lb 14.4 oz)   LMP  (LMP Unknown)   SpO2 95%   Breastfeeding No   BMI 34.66 kg/m    Body mass index is 34.66 kg/m .  GENERAL :  In no apparent distress  SKIN: Normal color, normal temperature, texture.  No hirsutism, alopecia or purple striae.  She is diaphoretic  EYES: PERRL, EOMI, No scleral icterus,  No proptosis, conjunctival redness, stare, retraction  RESP: Lungs clear to auscultation bilaterally  CARDIAC: Regular rate and rhythm, normal S1 S2, without murmurs, rubs or gallops    NEURO: awake, alert, responds appropriately to questions.  Cranial nerves intact.   Moves all extremities; Gait normal.  No tremor of the outstretched hand.    EXTREMITIES: No clubbing, cyanosis or edema.    DATA REVIEW  She does not monitor her blood sugars consistently        Again, thank you for allowing me to participate in the care of your patient.        Sincerely,        Neli Blanchard PA-C  "

## 2024-09-15 ENCOUNTER — HEALTH MAINTENANCE LETTER (OUTPATIENT)
Age: 64
End: 2024-09-15

## 2024-11-01 ENCOUNTER — HOSPITAL ENCOUNTER (OUTPATIENT)
Dept: MAMMOGRAPHY | Facility: CLINIC | Age: 64
Discharge: HOME | End: 2024-11-01
Attending: STUDENT IN AN ORGANIZED HEALTH CARE EDUCATION/TRAINING PROGRAM | Admitting: STUDENT IN AN ORGANIZED HEALTH CARE EDUCATION/TRAINING PROGRAM
Payer: MEDICARE

## 2024-11-01 DIAGNOSIS — Z12.31 VISIT FOR SCREENING MAMMOGRAM: ICD-10-CM

## 2024-11-01 PROCEDURE — 77063 BREAST TOMOSYNTHESIS BI: CPT

## 2024-11-26 ENCOUNTER — LAB REQUISITION (OUTPATIENT)
Dept: LAB | Facility: CLINIC | Age: 64
End: 2024-11-26

## 2024-11-26 DIAGNOSIS — D50.8 OTHER IRON DEFICIENCY ANEMIAS: ICD-10-CM

## 2024-11-26 PROCEDURE — 82728 ASSAY OF FERRITIN: CPT | Performed by: STUDENT IN AN ORGANIZED HEALTH CARE EDUCATION/TRAINING PROGRAM

## 2024-11-26 PROCEDURE — 83550 IRON BINDING TEST: CPT | Performed by: STUDENT IN AN ORGANIZED HEALTH CARE EDUCATION/TRAINING PROGRAM

## 2024-11-27 LAB
IRON BINDING CAPACITY (ROCHE): 371 UG/DL (ref 240–430)
IRON SATN MFR SERPL: 7 % (ref 15–46)
IRON SERPL-MCNC: 26 UG/DL (ref 37–145)

## 2024-11-28 LAB — FERRITIN SERPL-MCNC: 10 NG/ML (ref 11–328)

## 2025-01-11 ENCOUNTER — HEALTH MAINTENANCE LETTER (OUTPATIENT)
Age: 65
End: 2025-01-11

## 2025-03-11 ENCOUNTER — LAB REQUISITION (OUTPATIENT)
Dept: LAB | Facility: CLINIC | Age: 65
End: 2025-03-11

## 2025-03-11 DIAGNOSIS — E61.1 IRON DEFICIENCY: ICD-10-CM

## 2025-03-11 DIAGNOSIS — D50.8 OTHER IRON DEFICIENCY ANEMIAS: ICD-10-CM

## 2025-03-11 LAB
IRON BINDING CAPACITY (ROCHE): 377 UG/DL (ref 240–430)
IRON SATN MFR SERPL: 16 % (ref 15–46)
IRON SERPL-MCNC: 59 UG/DL (ref 37–145)

## 2025-03-11 PROCEDURE — 83550 IRON BINDING TEST: CPT | Performed by: STUDENT IN AN ORGANIZED HEALTH CARE EDUCATION/TRAINING PROGRAM

## 2025-03-11 PROCEDURE — 83540 ASSAY OF IRON: CPT | Performed by: STUDENT IN AN ORGANIZED HEALTH CARE EDUCATION/TRAINING PROGRAM

## 2025-03-11 PROCEDURE — 82728 ASSAY OF FERRITIN: CPT | Performed by: STUDENT IN AN ORGANIZED HEALTH CARE EDUCATION/TRAINING PROGRAM

## 2025-03-12 LAB — FERRITIN SERPL-MCNC: 13 NG/ML (ref 11–328)

## 2025-03-24 ENCOUNTER — ANCILLARY ORDERS (OUTPATIENT)
Dept: GENERAL RADIOLOGY | Facility: CLINIC | Age: 65
End: 2025-03-24
Payer: MEDICARE

## 2025-03-24 DIAGNOSIS — Z78.0 ASYMPTOMATIC POSTMENOPAUSAL STATE: Primary | ICD-10-CM

## 2025-03-25 ENCOUNTER — PATIENT OUTREACH (OUTPATIENT)
Dept: CARE COORDINATION | Facility: CLINIC | Age: 65
End: 2025-03-25
Payer: MEDICARE

## 2025-04-26 ENCOUNTER — HEALTH MAINTENANCE LETTER (OUTPATIENT)
Age: 65
End: 2025-04-26

## 2025-05-29 ENCOUNTER — LAB REQUISITION (OUTPATIENT)
Dept: LAB | Facility: CLINIC | Age: 65
End: 2025-05-29

## 2025-05-29 DIAGNOSIS — D50.8 OTHER IRON DEFICIENCY ANEMIAS: ICD-10-CM

## 2025-05-29 LAB
FERRITIN SERPL-MCNC: 12 NG/ML (ref 11–328)
IRON BINDING CAPACITY (ROCHE): 369 UG/DL (ref 240–430)
IRON SATN MFR SERPL: 13 % (ref 15–46)
IRON SERPL-MCNC: 48 UG/DL (ref 37–145)

## 2025-05-29 PROCEDURE — 83550 IRON BINDING TEST: CPT | Performed by: STUDENT IN AN ORGANIZED HEALTH CARE EDUCATION/TRAINING PROGRAM

## 2025-05-29 PROCEDURE — 82728 ASSAY OF FERRITIN: CPT | Performed by: STUDENT IN AN ORGANIZED HEALTH CARE EDUCATION/TRAINING PROGRAM

## 2025-06-26 ENCOUNTER — LAB (OUTPATIENT)
Dept: LAB | Facility: CLINIC | Age: 65
End: 2025-06-26
Payer: MEDICARE

## 2025-06-26 DIAGNOSIS — G62.9 PERIPHERAL NERVE DISORDER: ICD-10-CM

## 2025-06-26 DIAGNOSIS — E55.9 AVITAMINOSIS D: Primary | ICD-10-CM

## 2025-06-26 DIAGNOSIS — G43.E09 CHRONIC MIGRAINE WITH AURA: ICD-10-CM

## 2025-06-26 LAB
CK SERPL-CCNC: 73 U/L (ref 26–192)
TOTAL PROTEIN SERUM FOR ELP: 6.6 G/DL (ref 6.4–8.3)
VIT B12 SERPL-MCNC: 501 PG/ML (ref 232–1245)
VIT D+METAB SERPL-MCNC: 55 NG/ML (ref 20–50)

## 2025-06-26 PROCEDURE — 86341 ISLET CELL ANTIBODY: CPT

## 2025-06-26 PROCEDURE — 86041 ACETYLCHOLN RCPTR BNDNG ANTB: CPT

## 2025-06-26 PROCEDURE — 82550 ASSAY OF CK (CPK): CPT

## 2025-06-26 PROCEDURE — 82306 VITAMIN D 25 HYDROXY: CPT

## 2025-06-26 PROCEDURE — 82607 VITAMIN B-12: CPT

## 2025-06-26 PROCEDURE — 84155 ASSAY OF PROTEIN SERUM: CPT

## 2025-06-26 PROCEDURE — 86042 ACETYLCHOLN RCPTR BLCKG ANTB: CPT

## 2025-06-26 PROCEDURE — 36415 COLL VENOUS BLD VENIPUNCTURE: CPT

## 2025-06-28 LAB
ACHR BLOCK AB/ACHR TOTAL SFR SER: 0 %
GAD65 AB SER IA-ACNC: <5 IU/ML

## 2025-07-19 ENCOUNTER — HEALTH MAINTENANCE LETTER (OUTPATIENT)
Age: 65
End: 2025-07-19

## 2025-08-09 ENCOUNTER — HEALTH MAINTENANCE LETTER (OUTPATIENT)
Age: 65
End: 2025-08-09

## (undated) DEVICE — TUBING SUCTION MEDI-VAC 1/4"X20' N620A

## (undated) DEVICE — SUCTION MANIFOLD NEPTUNE 2 SYS 1 PORT 702-025-000

## (undated) DEVICE — ENDO FORCEP SPIKED SERRATED SHAFT JUMBO 239CM G56998

## (undated) DEVICE — SOL WATER IRRIG 1000ML BOTTLE 2F7114